# Patient Record
Sex: MALE | Race: WHITE | HISPANIC OR LATINO | Employment: UNEMPLOYED | URBAN - METROPOLITAN AREA
[De-identification: names, ages, dates, MRNs, and addresses within clinical notes are randomized per-mention and may not be internally consistent; named-entity substitution may affect disease eponyms.]

---

## 2024-02-16 ENCOUNTER — HOSPITAL ENCOUNTER (EMERGENCY)
Facility: HOSPITAL | Age: 19
Discharge: PSYCHIATRIC OR SUBSTANCE USE FACILITY | End: 2024-02-17
Attending: INTERNAL MEDICINE

## 2024-02-16 ENCOUNTER — HOSPITAL ENCOUNTER (EMERGENCY)
Facility: HOSPITAL | Age: 19
Discharge: HOME | End: 2024-02-16
Attending: EMERGENCY MEDICINE

## 2024-02-16 VITALS
DIASTOLIC BLOOD PRESSURE: 74 MMHG | SYSTOLIC BLOOD PRESSURE: 132 MMHG | TEMPERATURE: 98 F | HEIGHT: 66 IN | BODY MASS INDEX: 26.84 KG/M2 | HEART RATE: 76 BPM | OXYGEN SATURATION: 99 % | RESPIRATION RATE: 18 BRPM | WEIGHT: 167 LBS

## 2024-02-16 VITALS
HEART RATE: 90 BPM | RESPIRATION RATE: 16 BRPM | TEMPERATURE: 98 F | SYSTOLIC BLOOD PRESSURE: 123 MMHG | OXYGEN SATURATION: 100 % | DIASTOLIC BLOOD PRESSURE: 83 MMHG | WEIGHT: 149.94 LBS

## 2024-02-16 DIAGNOSIS — Z00.8 MEDICAL CLEARANCE FOR PSYCHIATRIC ADMISSION: Primary | ICD-10-CM

## 2024-02-16 DIAGNOSIS — F34.1 DYSTHYMIA: Primary | ICD-10-CM

## 2024-02-16 DIAGNOSIS — R44.0 AUDITORY HALLUCINATIONS: ICD-10-CM

## 2024-02-16 DIAGNOSIS — F41.9 ANXIETY: ICD-10-CM

## 2024-02-16 DIAGNOSIS — R45.851 SUICIDAL IDEATIONS: ICD-10-CM

## 2024-02-16 LAB
ALBUMIN SERPL-MCNC: 4.7 G/DL (ref 3.5–5)
ALBUMIN/GLOB SERPL: 1.8 RATIO (ref 1.1–2)
ALP SERPL-CCNC: 77 UNIT/L (ref 40–150)
ALT SERPL-CCNC: 66 UNIT/L (ref 0–55)
AMPHET UR QL SCN: NEGATIVE
APAP SERPL-MCNC: <17.4 UG/ML (ref 17.4–30)
APPEARANCE UR: CLEAR
AST SERPL-CCNC: 40 UNIT/L (ref 5–34)
BARBITURATE SCN PRESENT UR: NEGATIVE
BASOPHILS # BLD AUTO: 0.01 X10(3)/MCL
BASOPHILS NFR BLD AUTO: 0.1 %
BENZODIAZ UR QL SCN: NEGATIVE
BILIRUB SERPL-MCNC: 0.8 MG/DL
BILIRUB UR QL STRIP.AUTO: NEGATIVE
BUN SERPL-MCNC: 9 MG/DL (ref 8.9–20.6)
CALCIUM SERPL-MCNC: 9.4 MG/DL (ref 8.4–10.2)
CANNABINOIDS UR QL SCN: NEGATIVE
CHLORIDE SERPL-SCNC: 106 MMOL/L (ref 98–107)
CO2 SERPL-SCNC: 23 MMOL/L (ref 22–29)
COCAINE UR QL SCN: NEGATIVE
COLOR UR AUTO: YELLOW
CREAT SERPL-MCNC: 0.82 MG/DL (ref 0.73–1.18)
EOSINOPHIL # BLD AUTO: 0.03 X10(3)/MCL (ref 0–0.9)
EOSINOPHIL NFR BLD AUTO: 0.3 %
ERYTHROCYTE [DISTWIDTH] IN BLOOD BY AUTOMATED COUNT: 13.3 % (ref 11.5–17)
ETHANOL SERPL-MCNC: <10 MG/DL
FENTANYL UR QL SCN: NEGATIVE
GFR SERPLBLD CREATININE-BSD FMLA CKD-EPI: >60 MLS/MIN/1.73/M2
GLOBULIN SER-MCNC: 2.6 GM/DL (ref 2.4–3.5)
GLUCOSE SERPL-MCNC: 102 MG/DL (ref 74–100)
GLUCOSE UR QL STRIP.AUTO: NEGATIVE
HCT VFR BLD AUTO: 48.1 % (ref 42–52)
HGB BLD-MCNC: 16 G/DL (ref 14–18)
IMM GRANULOCYTES # BLD AUTO: 0.01 X10(3)/MCL (ref 0–0.04)
IMM GRANULOCYTES NFR BLD AUTO: 0.1 %
KETONES UR QL STRIP.AUTO: NEGATIVE
LEUKOCYTE ESTERASE UR QL STRIP.AUTO: NEGATIVE
LYMPHOCYTES # BLD AUTO: 1.64 X10(3)/MCL (ref 0.6–4.6)
LYMPHOCYTES NFR BLD AUTO: 17.5 %
MCH RBC QN AUTO: 27.7 PG (ref 27–31)
MCHC RBC AUTO-ENTMCNC: 33.3 G/DL (ref 33–36)
MCV RBC AUTO: 83.4 FL (ref 80–94)
MDMA UR QL SCN: NEGATIVE
MONOCYTES # BLD AUTO: 0.56 X10(3)/MCL (ref 0.1–1.3)
MONOCYTES NFR BLD AUTO: 6 %
NEUTROPHILS # BLD AUTO: 7.12 X10(3)/MCL (ref 2.1–9.2)
NEUTROPHILS NFR BLD AUTO: 76 %
NITRITE UR QL STRIP.AUTO: NEGATIVE
OPIATES UR QL SCN: NEGATIVE
PCP UR QL: NEGATIVE
PH UR STRIP.AUTO: 7 [PH]
PH UR: 7 [PH] (ref 3–11)
PLATELET # BLD AUTO: 235 X10(3)/MCL (ref 130–400)
PMV BLD AUTO: 10.4 FL (ref 7.4–10.4)
POTASSIUM SERPL-SCNC: 3.3 MMOL/L (ref 3.5–5.1)
PROT SERPL-MCNC: 7.3 GM/DL (ref 6.4–8.3)
PROT UR QL STRIP.AUTO: NEGATIVE
RBC # BLD AUTO: 5.77 X10(6)/MCL (ref 4.7–6.1)
RBC UR QL AUTO: NEGATIVE
SALICYLATES SERPL-MCNC: <5 MG/DL (ref 15–30)
SARS-COV-2 RDRP RESP QL NAA+PROBE: NEGATIVE
SODIUM SERPL-SCNC: 139 MMOL/L (ref 136–145)
SP GR UR STRIP.AUTO: 1.01 (ref 1–1.03)
SPECIFIC GRAVITY, URINE AUTO (.000) (OHS): 1.01 (ref 1–1.03)
TSH SERPL-ACNC: 1.25 UIU/ML (ref 0.35–4.94)
UROBILINOGEN UR STRIP-ACNC: 0.2
WBC # SPEC AUTO: 9.37 X10(3)/MCL (ref 4.5–11.5)

## 2024-02-16 PROCEDURE — 87635 SARS-COV-2 COVID-19 AMP PRB: CPT | Performed by: INTERNAL MEDICINE

## 2024-02-16 PROCEDURE — 80143 DRUG ASSAY ACETAMINOPHEN: CPT | Performed by: INTERNAL MEDICINE

## 2024-02-16 PROCEDURE — 82077 ASSAY SPEC XCP UR&BREATH IA: CPT | Performed by: INTERNAL MEDICINE

## 2024-02-16 PROCEDURE — 85025 COMPLETE CBC W/AUTO DIFF WBC: CPT | Performed by: INTERNAL MEDICINE

## 2024-02-16 PROCEDURE — 80307 DRUG TEST PRSMV CHEM ANLYZR: CPT | Performed by: INTERNAL MEDICINE

## 2024-02-16 PROCEDURE — 81003 URINALYSIS AUTO W/O SCOPE: CPT | Mod: 59 | Performed by: INTERNAL MEDICINE

## 2024-02-16 PROCEDURE — 84443 ASSAY THYROID STIM HORMONE: CPT | Performed by: INTERNAL MEDICINE

## 2024-02-16 PROCEDURE — 99281 EMR DPT VST MAYX REQ PHY/QHP: CPT

## 2024-02-16 PROCEDURE — 80179 DRUG ASSAY SALICYLATE: CPT | Performed by: INTERNAL MEDICINE

## 2024-02-16 PROCEDURE — 80053 COMPREHEN METABOLIC PANEL: CPT | Performed by: INTERNAL MEDICINE

## 2024-02-16 PROCEDURE — 99285 EMERGENCY DEPT VISIT HI MDM: CPT

## 2024-02-16 NOTE — ED PROVIDER NOTES
Encounter Date: 2/16/2024       History     Chief Complaint   Patient presents with    Psychiatric Evaluation     REPORTS DEPRESSED, SI W PLAN TO USE KNIFE.  -HI.  +AH+VH REPORTED.  PT WANDED.        Mental Health Problem  The primary symptoms include depressed mood, dysphoric mood and somatic symptoms. The primary symptoms do not include aggression, agitation, bizarre behavior, delusions, disorganized speech, disorganized thinking, hallucinations, homicidal ideas, negative symptoms, paranoia, self-injury, suicidal ideas, suicidal threats or suicide attempt. This is a chronic problem.   Somatic symptoms do not include fatigue, headaches or abdominal pain.   The degree of incapacity that he is experiencing as a consequence of his illness is mild. Sequelae of the illness include harmed interpersonal relations. Additional symptoms of the illness include anhedonia. Additional symptoms of the illness do not include insomnia, hypersomnia, appetite change, unexpected weight change, fatigue, agitation, psychomotor retardation, feelings of worthlessness, attention impairment, euphoric mood, increased goal-directed activity, flight of ideas, inflated self-esteem, decreased need for sleep, distractible, poor judgment, visual change, headaches, abdominal pain or seizures. He does not admit to suicidal ideas. He does not contemplate harming himself. He does not contemplate injuring another person. Risk factors that are present for mental illness include a history of mental illness.     Review of patient's allergies indicates:  No Known Allergies  History reviewed. No pertinent past medical history.  History reviewed. No pertinent surgical history.  History reviewed. No pertinent family history.  Social History     Tobacco Use    Smoking status: Never    Smokeless tobacco: Never   Substance Use Topics    Drug use: Yes     Types: Marijuana     Review of Systems   Constitutional:  Negative for appetite change, fatigue and  unexpected weight change.   Gastrointestinal:  Negative for abdominal pain.   Neurological:  Negative for seizures and headaches.   Psychiatric/Behavioral:  Positive for dysphoric mood. Negative for agitation, hallucinations, homicidal ideas, paranoia, self-injury and suicidal ideas. The patient does not have insomnia.    All other systems reviewed and are negative.      Physical Exam     Initial Vitals [02/16/24 1407]   BP Pulse Resp Temp SpO2   123/83 90 16 97.9 °F (36.6 °C) 100 %      MAP       --         Physical Exam    Nursing note and vitals reviewed.  Constitutional: He appears well-developed and well-nourished. He is not diaphoretic. No distress.   HENT:   Head: Normocephalic and atraumatic.   Eyes: EOM are normal. Pupils are equal, round, and reactive to light. Right eye exhibits no discharge. Left eye exhibits no discharge.   Neck: Neck supple. No thyromegaly present. No tracheal deviation present. No JVD present.   Normal range of motion.  Cardiovascular:  Normal rate, regular rhythm, normal heart sounds and intact distal pulses.           No murmur heard.  Pulmonary/Chest: Breath sounds normal. No stridor. No respiratory distress. He has no wheezes. He has no rhonchi. He has no rales.   Abdominal: Abdomen is soft. He exhibits no distension. There is no abdominal tenderness. There is no rebound and no guarding.   Musculoskeletal:         General: No tenderness or edema. Normal range of motion.      Cervical back: Normal range of motion and neck supple.     Neurological: He is alert and oriented to person, place, and time. He has normal strength. No cranial nerve deficit. GCS score is 15. GCS eye subscore is 4. GCS verbal subscore is 5. GCS motor subscore is 6.   Skin: Skin is warm and dry. Capillary refill takes less than 2 seconds. No rash and no abscess noted. No erythema. No pallor.   Psychiatric: He has a normal mood and affect. His behavior is normal. Judgment and thought content normal.         ED  Course   Procedures  Labs Reviewed - No data to display       Imaging Results    None          Medications - No data to display  Medical Decision Making  18-year-old male presents to the emergency department today with a number of strange ideas, dysthymic, a number of apparently somatic ideas seemingly most compatible with anxiety.  Patient reports a history of bipolar and noncompliance with medications.  There are no suicidal ideas.  There are no evident overt delusional structures nor hallucinations.    Amount and/or Complexity of Data Reviewed  External Data Reviewed: notes.    Risk  Prescription drug management.  Risk Details: 18-year-old male presents to the emergency department with several vague symptoms all of which seemingly most compatible with somatic manifestations of anxiety.  There are no features concerning for overt psychosis.  There is simply is no suggestion of any imminent risk of life or health to patient or any identified others.  He is discharged with links to follow up mental health resources.                                      Clinical Impression:  Final diagnoses:  [F34.1] Dysthymia (Primary)  [F41.9] Anxiety          ED Disposition Condition    Discharge Stable          ED Prescriptions    None       Follow-up Information       Follow up With Specialties Details Why Contact Info    [facility] - Emergency Dept Emergency Medicine  As needed, If symptoms worsen [address] [facility] [id number] [phone]    [facility]  Call   [address] [place] [id number] [phone]             Chaim Melton MD  02/16/24 5615

## 2024-02-17 ENCOUNTER — HOSPITAL ENCOUNTER (INPATIENT)
Facility: HOSPITAL | Age: 19
LOS: 6 days | Discharge: HOME | DRG: 885 | End: 2024-02-23
Attending: PSYCHIATRY & NEUROLOGY | Admitting: PSYCHIATRY & NEUROLOGY

## 2024-02-17 DIAGNOSIS — F32.A DEPRESSION: ICD-10-CM

## 2024-02-17 PROCEDURE — 11400000 HC PSYCH PRIVATE ROOM

## 2024-02-17 PROCEDURE — 25000003 PHARM REV CODE 250: Performed by: PSYCHIATRY & NEUROLOGY

## 2024-02-17 PROCEDURE — 25000003 PHARM REV CODE 250: Performed by: NURSE PRACTITIONER

## 2024-02-17 RX ORDER — ESCITALOPRAM OXALATE 5 MG/1
5 TABLET ORAL DAILY
Status: DISCONTINUED | OUTPATIENT
Start: 2024-02-17 | End: 2024-02-19

## 2024-02-17 RX ORDER — IBUPROFEN 200 MG
1 TABLET ORAL DAILY
Status: DISCONTINUED | OUTPATIENT
Start: 2024-02-17 | End: 2024-02-18

## 2024-02-17 RX ORDER — BUSPIRONE HYDROCHLORIDE 5 MG/1
5 TABLET ORAL 2 TIMES DAILY
Status: DISCONTINUED | OUTPATIENT
Start: 2024-02-17 | End: 2024-02-19

## 2024-02-17 RX ORDER — QUETIAPINE FUMARATE 25 MG/1
25 TABLET, FILM COATED ORAL ONCE
Status: COMPLETED | OUTPATIENT
Start: 2024-02-17 | End: 2024-02-17

## 2024-02-17 RX ORDER — DIPHENHYDRAMINE HCL 50 MG
50 CAPSULE ORAL EVERY 4 HOURS PRN
Status: DISCONTINUED | OUTPATIENT
Start: 2024-02-17 | End: 2024-02-23 | Stop reason: HOSPADM

## 2024-02-17 RX ORDER — QUETIAPINE FUMARATE 50 MG/1
50 TABLET, EXTENDED RELEASE ORAL NIGHTLY
Status: DISCONTINUED | OUTPATIENT
Start: 2024-02-18 | End: 2024-02-19

## 2024-02-17 RX ORDER — QUETIAPINE FUMARATE 50 MG/1
50 TABLET, EXTENDED RELEASE ORAL DAILY
Status: DISCONTINUED | OUTPATIENT
Start: 2024-02-17 | End: 2024-02-17

## 2024-02-17 RX ORDER — HALOPERIDOL 5 MG/1
10 TABLET ORAL EVERY 4 HOURS PRN
Status: DISCONTINUED | OUTPATIENT
Start: 2024-02-17 | End: 2024-02-23 | Stop reason: HOSPADM

## 2024-02-17 RX ORDER — TRAZODONE HYDROCHLORIDE 100 MG/1
100 TABLET ORAL NIGHTLY PRN
Status: DISCONTINUED | OUTPATIENT
Start: 2024-02-17 | End: 2024-02-21

## 2024-02-17 RX ORDER — LORAZEPAM 1 MG/1
2 TABLET ORAL EVERY 4 HOURS PRN
Status: DISCONTINUED | OUTPATIENT
Start: 2024-02-17 | End: 2024-02-23 | Stop reason: HOSPADM

## 2024-02-17 RX ORDER — HYDROXYZINE HYDROCHLORIDE 50 MG/1
50 TABLET, FILM COATED ORAL EVERY 4 HOURS PRN
Status: DISCONTINUED | OUTPATIENT
Start: 2024-02-17 | End: 2024-02-23 | Stop reason: HOSPADM

## 2024-02-17 RX ORDER — ACETAMINOPHEN 325 MG/1
650 TABLET ORAL EVERY 6 HOURS PRN
Status: DISCONTINUED | OUTPATIENT
Start: 2024-02-17 | End: 2024-02-23 | Stop reason: HOSPADM

## 2024-02-17 RX ORDER — DIPHENHYDRAMINE HYDROCHLORIDE 50 MG/ML
50 INJECTION INTRAMUSCULAR; INTRAVENOUS EVERY 4 HOURS PRN
Status: DISCONTINUED | OUTPATIENT
Start: 2024-02-17 | End: 2024-02-23 | Stop reason: HOSPADM

## 2024-02-17 RX ORDER — HALOPERIDOL 5 MG/ML
10 INJECTION INTRAMUSCULAR EVERY 4 HOURS PRN
Status: DISCONTINUED | OUTPATIENT
Start: 2024-02-17 | End: 2024-02-23 | Stop reason: HOSPADM

## 2024-02-17 RX ORDER — ALUMINUM HYDROXIDE, MAGNESIUM HYDROXIDE, AND SIMETHICONE 1200; 120; 1200 MG/30ML; MG/30ML; MG/30ML
30 SUSPENSION ORAL EVERY 6 HOURS PRN
Status: DISCONTINUED | OUTPATIENT
Start: 2024-02-17 | End: 2024-02-23 | Stop reason: HOSPADM

## 2024-02-17 RX ORDER — LORAZEPAM 2 MG/ML
2 INJECTION INTRAMUSCULAR EVERY 4 HOURS PRN
Status: DISCONTINUED | OUTPATIENT
Start: 2024-02-17 | End: 2024-02-23 | Stop reason: HOSPADM

## 2024-02-17 RX ADMIN — QUETIAPINE FUMARATE 25 MG: 25 TABLET ORAL at 01:02

## 2024-02-17 RX ADMIN — HYDROXYZINE HYDROCHLORIDE 50 MG: 50 TABLET, FILM COATED ORAL at 02:02

## 2024-02-17 RX ADMIN — BUSPIRONE HYDROCHLORIDE 5 MG: 5 TABLET ORAL at 08:02

## 2024-02-17 RX ADMIN — ACETAMINOPHEN 650 MG: 325 TABLET, FILM COATED ORAL at 08:02

## 2024-02-17 RX ADMIN — TRAZODONE HYDROCHLORIDE 100 MG: 100 TABLET ORAL at 08:02

## 2024-02-17 RX ADMIN — ESCITALOPRAM 5 MG: 5 TABLET, FILM COATED ORAL at 01:02
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
## 2024-02-17 NOTE — ED PROVIDER NOTES
Encounter Date: 2/16/2024       History     Chief Complaint   Patient presents with    Suicidal     SI, no plan,  depression, feels out of touch with reality.       18-year-old  male presents emergency department stating he has not feeling good and he is hearing voices which makes him have suicidal thoughts.  He has no plan but states he does not trust himself to go home because of the voices in his head that he can not get rid of.  He denies any psychiatric history and refuses to answer any questions about his family      Review of patient's allergies indicates:  No Known Allergies  No past medical history on file.  No past surgical history on file.  No family history on file.  Social History     Tobacco Use    Smoking status: Never    Smokeless tobacco: Never   Substance Use Topics    Alcohol use: Never    Drug use: Yes     Types: Marijuana     Review of Systems   Constitutional: Negative.  Negative for activity change, appetite change, chills, diaphoresis, fatigue, fever and unexpected weight change.   HENT: Negative.  Negative for congestion, dental problem, drooling, ear discharge, ear pain, facial swelling, hearing loss, mouth sores, nosebleeds, postnasal drip, rhinorrhea, sinus pressure, sinus pain, sneezing, sore throat, tinnitus, trouble swallowing and voice change.    Eyes: Negative.  Negative for photophobia, pain, discharge, redness, itching and visual disturbance.   Respiratory: Negative.  Negative for apnea, cough, choking, chest tightness, shortness of breath, wheezing and stridor.    Cardiovascular: Negative.  Negative for chest pain, palpitations and leg swelling.   Gastrointestinal: Negative.  Negative for abdominal distention, abdominal pain, anal bleeding, blood in stool, constipation, diarrhea, nausea, rectal pain and vomiting.   Endocrine: Negative.  Negative for cold intolerance, heat intolerance, polydipsia, polyphagia and polyuria.   Genitourinary: Negative.  Negative for decreased  urine volume, difficulty urinating, dysuria, enuresis, flank pain, frequency, genital sores, hematuria, penile discharge, penile pain, penile swelling, scrotal swelling, testicular pain and urgency.   Musculoskeletal: Negative.  Negative for arthralgias, back pain, gait problem, joint swelling, myalgias, neck pain and neck stiffness.   Skin: Negative.  Negative for color change, pallor, rash and wound.   Allergic/Immunologic: Negative.  Negative for environmental allergies, food allergies and immunocompromised state.   Neurological: Negative.  Negative for dizziness, tremors, seizures, syncope, facial asymmetry, speech difficulty, weakness, light-headedness, numbness and headaches.   Hematological: Negative.  Negative for adenopathy. Does not bruise/bleed easily.   Psychiatric/Behavioral:  Positive for hallucinations and suicidal ideas. Negative for agitation, behavioral problems, confusion, decreased concentration, dysphoric mood, self-injury and sleep disturbance. The patient is nervous/anxious. The patient is not hyperactive.    All other systems reviewed and are negative.      Physical Exam     Initial Vitals [02/16/24 2125]   BP Pulse Resp Temp SpO2   134/79 80 18 98.4 °F (36.9 °C) 98 %      MAP       --         Physical Exam    Nursing note and vitals reviewed.  Constitutional: He appears well-developed and well-nourished.   HENT:   Head: Normocephalic and atraumatic.   Eyes: Conjunctivae and EOM are normal. Pupils are equal, round, and reactive to light.   Neck: Neck supple.   Normal range of motion.  Cardiovascular:  Normal rate and regular rhythm.           Pulmonary/Chest: Breath sounds normal.   Abdominal: Abdomen is soft. Bowel sounds are normal.   Musculoskeletal:         General: Normal range of motion.      Cervical back: Normal range of motion and neck supple.     Neurological: He is alert and oriented to person, place, and time.   Skin: Skin is warm and dry. Capillary refill takes less than 2  seconds.   Psychiatric: His speech is delayed. He is slowed. Thought content is paranoid. Cognition and memory are normal. He exhibits a depressed mood. He expresses suicidal ideation.         ED Course   Procedures  Labs Reviewed   COMPREHENSIVE METABOLIC PANEL - Abnormal; Notable for the following components:       Result Value    Potassium Level 3.3 (*)     Glucose Level 102 (*)     Alanine Aminotransferase 66 (*)     Aspartate Aminotransferase 40 (*)     All other components within normal limits   ACETAMINOPHEN LEVEL - Abnormal; Notable for the following components:    Acetaminophen Level <17.4 (*)     All other components within normal limits   SALICYLATE LEVEL - Abnormal; Notable for the following components:    Salicylate Level <5.0 (*)     All other components within normal limits   TSH - Normal   URINALYSIS, REFLEX TO URINE CULTURE - Normal   DRUG SCREEN, URINE (BEAKER) - Normal    Narrative:     Cut off concentrations:    Amphetamines - 1000 ng/ml  Barbiturates - 200 ng/ml  Benzodiazepine - 200 ng/ml  Cannabinoids (THC) - 50 ng/ml  Cocaine - 300 ng/ml  Fentanyl - 1.0 ng/ml  MDMA - 500 ng/ml  Opiates - 300 ng/ml   Phencyclidine (PCP) - 25 ng/ml    Specimen submitted for drug analysis and tested for pH and specific gravity in order to evaluate sample integrity. Suspect tampering if specific gravity is <1.003 and/or pH is not within the range of 4.5 - 8.0  False negatives may result form substances such as bleach added to urine.  False positives may result for the presence of a substance with similar chemical structure to the drug or its metabolite.    This test provides only a PRELIMINARY analytical test result. A more specific alternate chemical method must be used in order to obtain a confirmed analytical result. Gas chromatography/mass spectrometry (GC/MS) is the preferred confirmatory method. Other chemical confirmation methods are available. Clinical consideration and professional judgement should be  applied to any drug of abuse test result, particularly when preliminary positive results are used.    Positive results will be confirmed only at the physicians request. Unconfirmed screening results are to be used only for medical purposes (treatment).        ALCOHOL,MEDICAL (ETHANOL) - Normal   SARS-COV-2 RNA AMPLIFICATION, QUAL - Normal    Narrative:     The IDNOW COVID-19 assay is a rapid molecular in vitro diagnostic test utilizing an isothermal nucleic acid amplification technology intended for the qualitative detection of nucleic acid from the SARS-CoV-2 viral RNA in direct nasal, nasopharyngeal or throat swabs from individuals who are suspected of COVID-19 by their healthcare provider.   CBC W/ AUTO DIFFERENTIAL    Narrative:     The following orders were created for panel order CBC auto differential.  Procedure                               Abnormality         Status                     ---------                               -----------         ------                     CBC with Differential[[id number]]                           Final result                 Please view results for these tests on the individual orders.   CBC WITH DIFFERENTIAL          Imaging Results    None          Medications - No data to display  Medical Decision Making  18-year-old  male presents emergency department with auditory hallucinations and suicidal ideation.  Differential included polysubstance abuse, schizophrenia, bipolar with psychosis.  Workup was done spoke with patient about possible discharge to follow up with psych as an outpatient however patient does not trust himself to be at home alone and states that he fears what happened because he can not turned the voices off in his head.  I feel he will benefit from inpatient psychiatric services so that point I have visited out a physician's Emergency certificate.  We ordered the psychiatric workup and now patient is cleared for psychiatric services    Problems  Addressed:  Auditory hallucinations: acute illness or injury  Medical clearance for psychiatric admission: acute illness or injury  Suicidal ideations: acute illness or injury    Amount and/or Complexity of Data Reviewed  Labs: ordered. Decision-making details documented in ED Course.    Risk  Decision regarding hospitalization.  Diagnosis or treatment significantly limited by social determinants of health.                  Medically cleared for psychiatry placement: 2/16/2024 11:24 PM                   Clinical Impression:  Final diagnoses:  [Z00.8] Medical clearance for psychiatric admission (Primary)  [R45.851] Suicidal ideations  [R44.0] Auditory hallucinations          ED Disposition Condition    Transfer to Psych Facility Stable          ED Prescriptions    None       Follow-up Information    None          Chad Tello MD  02/16/24 7413

## 2024-02-17 NOTE — H&P
[facility]  History & Physical    Subjective:      Chief Complaint/Reason for Admission: auditory hallucinations     Ciro King is a 18 y.o. male. Auditory hallucinations     No past medical history on file.  No past surgical history on file.  No family history on file.  Social History     Tobacco Use    Smoking status: Never    Smokeless tobacco: Never   Substance Use Topics    Alcohol use: Never    Drug use: Yes     Types: Marijuana       No medications prior to admission.     Review of patient's allergies indicates:  No Known Allergies     Review of Systems   Constitutional: Negative.    HENT: Negative.     Eyes: Negative.    Respiratory: Negative.     Cardiovascular: Negative.    Gastrointestinal: Negative.    Genitourinary: Negative.    Musculoskeletal: Negative.    Skin: Negative.    Neurological: Negative.    Endo/Heme/Allergies: Negative.    Psychiatric/Behavioral:  Positive for hallucinations. Negative for depression, substance abuse and suicidal ideas.        Objective:      Vital Signs (Most Recent)  Temp: 99.7 °F (37.6 °C) (02/17/24 0701)  Pulse: (!) 114 (02/17/24 0701)  Resp: 18 (02/17/24 0701)  BP: 117/68 (02/17/24 0701)  SpO2: 100 % (02/17/24 0701)    Vital Signs Range (Last 24H):  Temp:  [98.4 °F (36.9 °C)-99.7 °F (37.6 °C)]   Pulse:  []   Resp:  [18]   BP: (117-134)/(68-80)   SpO2:  [98 %-100 %]     Physical Exam  HENT:      Head: Normocephalic.      Right Ear: Tympanic membrane normal.      Left Ear: Tympanic membrane normal.      Nose: Nose normal.      Mouth/Throat:      Mouth: Mucous membranes are moist.   Eyes:      Extraocular Movements: Extraocular movements intact.      Pupils: Pupils are equal, round, and reactive to light.   Cardiovascular:      Rate and Rhythm: Normal rate and regular rhythm.   Pulmonary:      Effort: Pulmonary effort is normal.   Abdominal:      General: Abdomen is flat.   Musculoskeletal:         General: Normal  range of motion.   Skin:     General: Skin is warm.   Neurological:      General: No focal deficit present.      Mental Status: He is alert and oriented to person, place, and time.      Comments: Vision normal   Hearing normal   EOM intact   Face muscles normal  Facial sensation normal   Shrugs shoulders  Tongue midline            Data Review:    Recent Results (from the past 48 hour(s))   Drug Screen panel, emergency    Collection Time: 02/16/24  9:41 PM   Result Value Ref Range    Amphetamines, Urine Negative Negative    Barbituates, Urine Negative Negative    Benzodiazepine, Urine Negative Negative    Cannabinoids, Urine Negative Negative    Cocaine, Urine Negative Negative    Fentanyl, Urine Negative Negative    MDMA, Urine Negative Negative    Opiates, Urine Negative Negative    Phencyclidine, Urine Negative Negative    pH, Urine 7.0 3.0 - 11.0    Specific Gravity, Urine Auto 1.010 1.001 - 1.035   COVID-19 Rapid Screening    Collection Time: 02/16/24  9:41 PM   Result Value Ref Range    SARS COV-2 MOLECULAR Negative Negative   Urinalysis, Reflex to Urine Culture    Collection Time: 02/16/24  9:42 PM    Specimen: Urine   Result Value Ref Range    Color, UA Yellow Yellow, Light-Yellow, Dark Yellow, Lizette, Straw    Appearance, UA Clear Clear    Specific Gravity, UA 1.010 1.005 - 1.030    pH, UA 7.0 5.0 - 8.5    Protein, UA Negative Negative    Glucose, UA Negative Negative, Normal    Ketones, UA Negative Negative    Blood, UA Negative Negative    Bilirubin, UA Negative Negative    Urobilinogen, UA 0.2 0.2, 1.0, Normal    Nitrites, UA Negative Negative    Leukocyte Esterase, UA Negative Negative   Comprehensive metabolic panel    Collection Time: 02/16/24 10:02 PM   Result Value Ref Range    Sodium Level 139 136 - 145 mmol/L    Potassium Level 3.3 (L) 3.5 - 5.1 mmol/L    Chloride 106 98 - 107 mmol/L    Carbon Dioxide 23 22 - 29 mmol/L    Glucose Level 102 (H) 74 - 100 mg/dL    Blood Urea Nitrogen 9.0 8.9 - 20.6  mg/dL    Creatinine 0.82 0.73 - 1.18 mg/dL    Calcium Level Total 9.4 8.4 - 10.2 mg/dL    Protein Total 7.3 6.4 - 8.3 gm/dL    Albumin Level 4.7 3.5 - 5.0 g/dL    Globulin 2.6 2.4 - 3.5 gm/dL    Albumin/Globulin Ratio 1.8 1.1 - 2.0 ratio    Bilirubin Total 0.8 <=1.5 mg/dL    Alkaline Phosphatase 77 40 - 150 unit/L    Alanine Aminotransferase 66 (H) 0 - 55 unit/L    Aspartate Aminotransferase 40 (H) 5 - 34 unit/L    eGFR >60 mls/min/1.73/m2   TSH    Collection Time: 02/16/24 10:02 PM   Result Value Ref Range    TSH 1.248 0.350 - 4.940 uIU/mL   Ethanol    Collection Time: 02/16/24 10:02 PM   Result Value Ref Range    Ethanol Level <10.0 <=10.0 mg/dL   Acetaminophen level    Collection Time: 02/16/24 10:02 PM   Result Value Ref Range    Acetaminophen Level <17.4 (L) 17.4 - 30.0 ug/ml   Salicylate level    Collection Time: 02/16/24 10:02 PM   Result Value Ref Range    Salicylate Level <5.0 (L) 15.0 - 30.0 mg/dL   CBC with Differential    Collection Time: 02/16/24 10:02 PM   Result Value Ref Range    WBC 9.37 4.50 - 11.50 x10(3)/mcL    RBC 5.77 4.70 - 6.10 x10(6)/mcL    Hgb 16.0 14.0 - 18.0 g/dL    Hct 48.1 42.0 - 52.0 %    MCV 83.4 80.0 - 94.0 fL    MCH 27.7 27.0 - 31.0 pg    MCHC 33.3 33.0 - 36.0 g/dL    RDW 13.3 11.5 - 17.0 %    Platelet 235 130 - 400 x10(3)/mcL    MPV 10.4 7.4 - 10.4 fL    Neut % 76.0 %    Lymph % 17.5 %    Mono % 6.0 %    Eos % 0.3 %    Basophil % 0.1 %    Lymph # 1.64 0.6 - 4.6 x10(3)/mcL    Neut # 7.12 2.1 - 9.2 x10(3)/mcL    Mono # 0.56 0.1 - 1.3 x10(3)/mcL    Eos # 0.03 0 - 0.9 x10(3)/mcL    Baso # 0.01 <=0.2 x10(3)/mcL    IG# 0.01 0 - 0.04 x10(3)/mcL    IG% 0.1 %        No results found.       Assessment and Plan       Auditory hallucinations
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
## 2024-02-17 NOTE — NURSING
"Pt was admitted earlier today on a PEC from [facility] , currently voicing no ADRs or physical complaints at this time, vital signs are stable, pt is not in any physical distress at the current time, per ER notes, pt is "hearing voices",  This makes him have suicidal ideations, currently voicing no homicidal ideations at this time, pt was put on medication in the past but he did not like the way it made him feel so he stopped taking them, pt will be seen by LISANDRO Graham today, also will be seen by a medical doctor for a routine H and P,  Pt rec'd Atarax on admit for anxiety, will monitor with suicide prec., for anger, psychosis and will be  Assisted prn.  "

## 2024-02-17 NOTE — NURSING
"PRN Medication Follow-up Note:    Behavior:    Patient (Ciro King is a 18 y.o. male, : 2005, MRN: [id number])     Allergies: Patient has no known allergies.    Donnas  height is 5' 6" (1.676 m) and weight is 65.8 kg (145 lb). His oral temperature is 98.7 °F (37.1 °C). His blood pressure is 120/80 and his pulse is 97. His respiration is 18 and oxygen saturation is 100%.     Administered Hydroxyzine 50 mg per physician order to Ciro for anxiety.      Intervention:    Intervention to Ciro's response: None required.       Response:    Ciro's response: No further complaints of anxiety.      Plan:     Continue to monitor per MD/PA/APRN orders; and reevaluate medication effectiveness within 30 minutes.    "

## 2024-02-17 NOTE — PLAN OF CARE
Problem: Adult Inpatient Plan of Care  Goal: Plan of Care Review  Outcome: Ongoing, Not Progressing  Goal: Patient-Specific Goal (Individualized)  Outcome: Ongoing, Not Progressing  Goal: Absence of Hospital-Acquired Illness or Injury  Outcome: Ongoing, Not Progressing  Goal: Optimal Comfort and Wellbeing  Outcome: Ongoing, Not Progressing  Goal: Readiness for Transition of Care  Outcome: Ongoing, Not Progressing     Problem: Behavior Regulation Impairment (Psychotic Signs/Symptoms)  Goal: Improved Behavioral Control (Psychotic Signs/Symptoms)  Outcome: Ongoing, Not Progressing     Problem: Cognitive Impairment (Psychotic Signs/Symptoms)  Goal: Optimal Cognitive Function (Psychotic Signs/Symptoms)  Outcome: Ongoing, Not Progressing     Problem: Decreased Participation and Engagement (Psychotic Signs/Symptoms)  Goal: Increased Participation and Engagement (Psychotic Signs/Symptoms)  Outcome: Ongoing, Not Progressing     Problem: Mood Impairment (Psychotic Signs/Symptoms)  Goal: Improved Mood Symptoms (Psychotic Signs/Symptoms)  Outcome: Ongoing, Not Progressing     Problem: Psychomotor Impairment (Psychotic Signs/Symptoms)  Goal: Improved Psychomotor Symptoms (Psychotic Signs/Symptoms)  Outcome: Ongoing, Not Progressing     Problem: Sensory Perception Impairment (Psychotic Signs/Symptoms)  Goal: Decreased Sensory Symptoms (Psychotic Signs/Symptoms)  Outcome: Ongoing, Not Progressing     Problem: Sleep Disturbance (Psychotic Signs/Symptoms)  Goal: Improved Sleep (Psychotic Signs/Symptoms)  Outcome: Ongoing, Not Progressing     Problem: Social, Occupational or Functional Impairment (Psychotic Signs/Symptoms)  Goal: Enhanced Social, Occupational or Functional Skills (Psychotic Signs/Symptoms)  Outcome: Ongoing, Not Progressing     Problem: Activity and Energy Impairment (Depressive Signs/Symptoms)  Goal: Optimized Energy Level (Depressive Signs/Symptoms)  Outcome: Ongoing, Not Progressing     Problem: Cognitive  Impairment (Depressive Signs/Symptoms)  Goal: Optimized Cognitive Function  Outcome: Ongoing, Not Progressing     Problem: Decreased Participation/Engagement (Depressive Signs/Symptoms)  Goal: Increased Participation and Engagement (Depressive Signs/Symptoms)  Outcome: Ongoing, Not Progressing     Problem: Cognitive Impairment (Depressive Signs/Symptoms)  Goal: Optimized Cognitive Function  Outcome: Ongoing, Not Progressing     Problem: Decreased Participation/Engagement (Depressive Signs/Symptoms)  Goal: Increased Participation and Engagement (Depressive Signs/Symptoms)  Outcome: Ongoing, Not Progressing     Problem: Feelings of Worthlessness, Hopelessness or Excessive Guilt (Depressive Signs/Symptoms)  Goal: Enhanced Self-Esteem and Confidence (Depressive Signs/Symptoms)  Outcome: Ongoing, Not Progressing     Problem: Mood Impairment (Depressive Signs/Symptoms)  Goal: Improved Mood Symptoms (Depressive Signs/Symptoms)  Outcome: Ongoing, Not Progressing     Problem: Nutrition Imbalance (Depressive Signs/Symptoms)  Goal: Optimized Nutrition Intake  Outcome: Ongoing, Not Progressing     Problem: Psychomotor Impairment (Depressive Signs/Symptoms)  Goal: Improved Psychomotor Symptoms (Depressive Signs/Symptoms)  Outcome: Ongoing, Not Progressing     Problem: Sleep Disturbance (Depressive Signs/Symptoms)  Goal: Improved Sleep (Depressive Signs/Symptoms)  Outcome: Ongoing, Not Progressing     Problem: Social, Occupational or Functional Impairment (Depressive Signs/Symptoms)  Goal: Enhanced Social, Occupational or Functional Skills (Depressive Signs/Symptoms)  Outcome: Ongoing, Not Progressing

## 2024-02-18 PROCEDURE — 11400000 HC PSYCH PRIVATE ROOM

## 2024-02-18 PROCEDURE — 25000003 PHARM REV CODE 250: Performed by: PSYCHIATRY & NEUROLOGY

## 2024-02-18 PROCEDURE — 25000003 PHARM REV CODE 250: Performed by: NURSE PRACTITIONER

## 2024-02-18 RX ADMIN — HYDROXYZINE HYDROCHLORIDE 50 MG: 50 TABLET, FILM COATED ORAL at 04:02

## 2024-02-18 RX ADMIN — QUETIAPINE FUMARATE 50 MG: 50 TABLET, EXTENDED RELEASE ORAL at 08:02

## 2024-02-18 RX ADMIN — TRAZODONE HYDROCHLORIDE 100 MG: 100 TABLET ORAL at 08:02

## 2024-02-18 RX ADMIN — BUSPIRONE HYDROCHLORIDE 5 MG: 5 TABLET ORAL at 08:02

## 2024-02-18 RX ADMIN — HYDROXYZINE HYDROCHLORIDE 50 MG: 50 TABLET, FILM COATED ORAL at 05:02

## 2024-02-18 RX ADMIN — ACETAMINOPHEN 650 MG: 325 TABLET, FILM COATED ORAL at 08:02

## 2024-02-18 RX ADMIN — ESCITALOPRAM 5 MG: 5 TABLET, FILM COATED ORAL at 08:02

## 2024-02-18 NOTE — NURSING
"Pt is currently voicing no ADRs or physical complaints at this time, vital signs are stable,   Pt is not in any physical distress at the current time, currently voicing no suicidal or homicidal ideations at this time, pt is very anxious, nervous, isolates, pt admits to paranoid delusions, states,  "I feel like my head is swelling.""Feel like my brain is melting.", appears to be RIS,  compliant with medication ordered, pt was seen yest. By LISANDRO Graham,  Seroquel XR ordered along with Lexapro and Buspar, pt did receive Atarax and Trazodone last night, reports trouble sleeping last night with racing thoughts and worrying, will monitor with suicide prec. For anger, and psychosis and will assist prn.  "

## 2024-02-18 NOTE — NURSING
"PRN Medication Follow-up Note:    Behavior:    Patient (Ciro King is a 18 y.o. male, : 2005, MRN: [id number])     Allergies: Patient has no known allergies.    Donnas  height is 5' 6" (1.676 m) and weight is 65.8 kg (145 lb). His oral temperature is 99 °F (37.2 °C). His blood pressure is 116/71 and his pulse is 95. His respiration is 18 and oxygen saturation is 96%.     Administered hydroxyzine 50 mg po per physician order to Ciro       Intervention:    Intervention to Ciro's response: to relieve anxiety.       Response:    Ciro's response: effective      Plan:     Continue to monitor per MD/PA/APRN orders; and reevaluate medication effectiveness within 30 minutes.   "

## 2024-02-18 NOTE — NURSING
"PRN Administration Note:    Behavior:    Patient (Ciro King is a 18 y.o. male, : 2005, MRN: [id number])     Allergies: Patient has no known allergies.    Ciro's  height is 5' 6" (1.676 m) and weight is 65.8 kg (145 lb). His oral temperature is 99 °F (37.2 °C). His blood pressure is 116/71 and his pulse is 95. His respiration is 18 and oxygen saturation is 96%.     Reason for PRN Administration: anxiety.    Intervention:    Administered hydroxyzine 50 mg po per physician order to Ciro       Response:    Ciro tolerated administration well.      Plan:     Continue to monitor per MD/PA/APRN orders; and reevaluate medication effectiveness within 30 minutes.   "

## 2024-02-18 NOTE — NURSING
"PRN Medication Follow-up Note:    Behavior:    Patient (Ciro King is a 18 y.o. male, : 2005, MRN: [id number])     Allergies: Patient has no known allergies.    Ciro's  height is 5' 6" (1.676 m) and weight is 65.8 kg (145 lb). His temperature is 98.4 °F (36.9 °C). His blood pressure is 127/87 and his pulse is 105. His respiration is 16 and oxygen saturation is 96%.     Administered tylenol 650 mg po and trazodone 100 mg po per physician order to Ciro       Intervention:    Intervention to Ciro's response: to relieve headache pain and promote sleep.       Response:    Ciro's response: effective      Plan:     Continue to monitor per MD/PA/APRN orders; and reevaluate medication effectiveness within 30 minutes.   "

## 2024-02-18 NOTE — PLAN OF CARE
Problem: Adult Inpatient Plan of Care  Goal: Plan of Care Review  Outcome: Ongoing, Progressing  Goal: Patient-Specific Goal (Individualized)  Outcome: Ongoing, Progressing  Goal: Absence of Hospital-Acquired Illness or Injury  Outcome: Ongoing, Progressing  Goal: Optimal Comfort and Wellbeing  Outcome: Ongoing, Progressing  Goal: Readiness for Transition of Care  Outcome: Ongoing, Progressing     Problem: Behavior Regulation Impairment (Psychotic Signs/Symptoms)  Goal: Improved Behavioral Control (Psychotic Signs/Symptoms)  Outcome: Ongoing, Progressing     Problem: Cognitive Impairment (Psychotic Signs/Symptoms)  Goal: Optimal Cognitive Function (Psychotic Signs/Symptoms)  Outcome: Ongoing, Progressing     Problem: Decreased Participation and Engagement (Psychotic Signs/Symptoms)  Goal: Increased Participation and Engagement (Psychotic Signs/Symptoms)  Outcome: Ongoing, Progressing     Problem: Mood Impairment (Psychotic Signs/Symptoms)  Goal: Improved Mood Symptoms (Psychotic Signs/Symptoms)  Outcome: Ongoing, Progressing     Problem: Psychomotor Impairment (Psychotic Signs/Symptoms)  Goal: Improved Psychomotor Symptoms (Psychotic Signs/Symptoms)  Outcome: Ongoing, Progressing     Problem: Sensory Perception Impairment (Psychotic Signs/Symptoms)  Goal: Decreased Sensory Symptoms (Psychotic Signs/Symptoms)  Outcome: Ongoing, Progressing     Problem: Sleep Disturbance (Psychotic Signs/Symptoms)  Goal: Improved Sleep (Psychotic Signs/Symptoms)  Outcome: Ongoing, Progressing     Problem: Social, Occupational or Functional Impairment (Psychotic Signs/Symptoms)  Goal: Enhanced Social, Occupational or Functional Skills (Psychotic Signs/Symptoms)  Outcome: Ongoing, Progressing     Problem: Activity and Energy Impairment (Depressive Signs/Symptoms)  Goal: Optimized Energy Level (Depressive Signs/Symptoms)  Outcome: Ongoing, Progressing     Problem: Decreased Participation/Engagement (Depressive Signs/Symptoms)  Goal:  Increased Participation and Engagement (Depressive Signs/Symptoms)  Outcome: Ongoing, Progressing     Problem: Feelings of Worthlessness, Hopelessness or Excessive Guilt (Depressive Signs/Symptoms)  Goal: Enhanced Self-Esteem and Confidence (Depressive Signs/Symptoms)  Outcome: Ongoing, Progressing     Problem: Mood Impairment (Depressive Signs/Symptoms)  Goal: Improved Mood Symptoms (Depressive Signs/Symptoms)  Outcome: Ongoing, Progressing     Problem: Nutrition Imbalance (Depressive Signs/Symptoms)  Goal: Optimized Nutrition Intake  Outcome: Ongoing, Progressing     Problem: Psychomotor Impairment (Depressive Signs/Symptoms)  Goal: Improved Psychomotor Symptoms (Depressive Signs/Symptoms)  Outcome: Ongoing, Progressing     Problem: Sleep Disturbance (Depressive Signs/Symptoms)  Goal: Improved Sleep (Depressive Signs/Symptoms)  Outcome: Ongoing, Progressing     Problem: Social, Occupational or Functional Impairment (Depressive Signs/Symptoms)  Goal: Enhanced Social, Occupational or Functional Skills (Depressive Signs/Symptoms)  Outcome: Ongoing, Progressing

## 2024-02-18 NOTE — NURSING
"PRN Administration Note:    Behavior:    Patient (Ciro King is a 18 y.o. male, : 2005, MRN: [id number])     Allergies: Patient has no known allergies.    Ciro's  height is 5' 6" (1.676 m) and weight is 65.8 kg (145 lb). His temperature is 98.4 °F (36.9 °C). His blood pressure is 127/87 and his pulse is 105. His respiration is 16 and oxygen saturation is 96%.     Reason for PRN Administration: headache and insomnia.    Intervention:    Administered tylenol 650 mg po and trazodone 100 mg po per physician order to Ciro       Response:    Ciro tolerated administration well.      Plan:     Continue to monitor per MD/PA/APRN orders; and reevaluate medication effectiveness within 30 minutes.   "

## 2024-02-18 NOTE — NURSING
"Daily Nursing Note:      Behavior:    Patient (Ciro King is a 18 y.o. male, : 2005, MRN: [id number]) demonstrating an affect that was flat and anxious. Ciro demonstrating mood that is depressed and anxious. Ciro had an appearance that was clean. Ciro denies suicidal ideation. Ciro denies suicide plan. Ciro denies homicidal ideation. Ciro denies hallucinations.    Ciro's  height is 5' 6" (1.676 m) and weight is 65.8 kg (145 lb). His temperature is 98.4 °F (36.9 °C). His blood pressure is 127/87 and his pulse is 105. His respiration is 16 and oxygen saturation is 96%.         Intervention:    Encourage Ciro to perform self-hygiene, grooming, and changing of clothing. Monitor Ciro's behavior and program compliance. Monitor Ciro for suicidal ideation, homicidal ideation, sleep disturbance, and hallucinations. Encourage Ciro to eat all portions of meals and assess for meal preferences. Monitor Ciro for intake and output to ensure hydration. Notify the Physician/Physician Assistant/Advance Practice Registered Nurse (MD/PA/APRN) for any medication refusal and any change in patient condition.      Response:    Ciro verbalizes understand of unit process and procedures.      Plan:     Continue to monitor per MD/PA/APRN orders; maintain patient safety.   "

## 2024-02-18 NOTE — NURSING
Pt with complaint of anxiety, pt rec'd Atarax 50 mg po prn anxiety, will monitor for effectiveness.

## 2024-02-19 LAB
ALBUMIN SERPL-MCNC: 4.5 G/DL (ref 3.5–5)
ALBUMIN/GLOB SERPL: 1.9 RATIO (ref 1.1–2)
ALP SERPL-CCNC: 80 UNIT/L (ref 40–150)
ALT SERPL-CCNC: 43 UNIT/L (ref 0–55)
AST SERPL-CCNC: 22 UNIT/L (ref 5–34)
BASOPHILS # BLD AUTO: 0.02 X10(3)/MCL
BASOPHILS NFR BLD AUTO: 0.3 %
BILIRUB SERPL-MCNC: 1 MG/DL
BUN SERPL-MCNC: 13.1 MG/DL (ref 8.9–20.6)
CALCIUM SERPL-MCNC: 9.7 MG/DL (ref 8.4–10.2)
CHLORIDE SERPL-SCNC: 108 MMOL/L (ref 98–107)
CHOLEST SERPL-MCNC: 135 MG/DL
CHOLEST/HDLC SERPL: 2 {RATIO} (ref 0–5)
CO2 SERPL-SCNC: 27 MMOL/L (ref 22–29)
CREAT SERPL-MCNC: 0.88 MG/DL (ref 0.73–1.18)
EOSINOPHIL # BLD AUTO: 0.08 X10(3)/MCL (ref 0–0.9)
EOSINOPHIL NFR BLD AUTO: 1.1 %
ERYTHROCYTE [DISTWIDTH] IN BLOOD BY AUTOMATED COUNT: 13.1 % (ref 11.5–17)
EST. AVERAGE GLUCOSE BLD GHB EST-MCNC: 93.9 MG/DL
GFR SERPLBLD CREATININE-BSD FMLA CKD-EPI: >60 MLS/MIN/1.73/M2
GLOBULIN SER-MCNC: 2.4 GM/DL (ref 2.4–3.5)
GLUCOSE SERPL-MCNC: 89 MG/DL (ref 74–100)
HBA1C MFR BLD: 4.9 %
HCT VFR BLD AUTO: 49.1 % (ref 42–52)
HDLC SERPL-MCNC: 56 MG/DL (ref 35–60)
HGB BLD-MCNC: 16.1 G/DL (ref 14–18)
IMM GRANULOCYTES # BLD AUTO: 0.01 X10(3)/MCL (ref 0–0.04)
IMM GRANULOCYTES NFR BLD AUTO: 0.1 %
LDLC SERPL CALC-MCNC: 71 MG/DL (ref 50–140)
LYMPHOCYTES # BLD AUTO: 2.88 X10(3)/MCL (ref 0.6–4.6)
LYMPHOCYTES NFR BLD AUTO: 39.2 %
MCH RBC QN AUTO: 27.5 PG (ref 27–31)
MCHC RBC AUTO-ENTMCNC: 32.8 G/DL (ref 33–36)
MCV RBC AUTO: 83.9 FL (ref 80–94)
MONOCYTES # BLD AUTO: 0.47 X10(3)/MCL (ref 0.1–1.3)
MONOCYTES NFR BLD AUTO: 6.4 %
NEUTROPHILS # BLD AUTO: 3.89 X10(3)/MCL (ref 2.1–9.2)
NEUTROPHILS NFR BLD AUTO: 52.9 %
NRBC BLD AUTO-RTO: 0 %
PLATELET # BLD AUTO: 230 X10(3)/MCL (ref 130–400)
PMV BLD AUTO: 10.9 FL (ref 7.4–10.4)
POTASSIUM SERPL-SCNC: 4.9 MMOL/L (ref 3.5–5.1)
PROT SERPL-MCNC: 6.9 GM/DL (ref 6.4–8.3)
RBC # BLD AUTO: 5.85 X10(6)/MCL (ref 4.7–6.1)
SODIUM SERPL-SCNC: 142 MMOL/L (ref 136–145)
T PALLIDUM AB SER QL: NONREACTIVE
TRIGL SERPL-MCNC: 42 MG/DL (ref 34–140)
TSH SERPL-ACNC: 1.73 UIU/ML (ref 0.35–4.94)
VLDLC SERPL CALC-MCNC: 8 MG/DL
WBC # SPEC AUTO: 7.35 X10(3)/MCL (ref 4.5–11.5)

## 2024-02-19 PROCEDURE — 83036 HEMOGLOBIN GLYCOSYLATED A1C: CPT | Performed by: PSYCHIATRY & NEUROLOGY

## 2024-02-19 PROCEDURE — 25000003 PHARM REV CODE 250: Performed by: NURSE PRACTITIONER

## 2024-02-19 PROCEDURE — 80053 COMPREHEN METABOLIC PANEL: CPT | Performed by: PSYCHIATRY & NEUROLOGY

## 2024-02-19 PROCEDURE — 80061 LIPID PANEL: CPT | Performed by: PSYCHIATRY & NEUROLOGY

## 2024-02-19 PROCEDURE — 86780 TREPONEMA PALLIDUM: CPT | Performed by: PSYCHIATRY & NEUROLOGY

## 2024-02-19 PROCEDURE — 85025 COMPLETE CBC W/AUTO DIFF WBC: CPT | Performed by: PSYCHIATRY & NEUROLOGY

## 2024-02-19 PROCEDURE — 11400000 HC PSYCH PRIVATE ROOM

## 2024-02-19 PROCEDURE — 84443 ASSAY THYROID STIM HORMONE: CPT | Performed by: PSYCHIATRY & NEUROLOGY

## 2024-02-19 PROCEDURE — 25000003 PHARM REV CODE 250: Performed by: PSYCHIATRY & NEUROLOGY

## 2024-02-19 RX ORDER — ESCITALOPRAM OXALATE 10 MG/1
10 TABLET ORAL DAILY
Status: DISCONTINUED | OUTPATIENT
Start: 2024-02-20 | End: 2024-02-21

## 2024-02-19 RX ORDER — ARIPIPRAZOLE 5 MG/1
10 TABLET ORAL DAILY
Status: DISCONTINUED | OUTPATIENT
Start: 2024-02-19 | End: 2024-02-21

## 2024-02-19 RX ADMIN — BUSPIRONE HYDROCHLORIDE 5 MG: 5 TABLET ORAL at 08:02

## 2024-02-19 RX ADMIN — TRAZODONE HYDROCHLORIDE 100 MG: 100 TABLET ORAL at 08:02

## 2024-02-19 RX ADMIN — ACETAMINOPHEN 650 MG: 325 TABLET, FILM COATED ORAL at 08:02

## 2024-02-19 RX ADMIN — HYDROXYZINE HYDROCHLORIDE 50 MG: 50 TABLET, FILM COATED ORAL at 08:02

## 2024-02-19 RX ADMIN — ESCITALOPRAM 5 MG: 5 TABLET, FILM COATED ORAL at 08:02

## 2024-02-19 RX ADMIN — ARIPIPRAZOLE 10 MG: 5 TABLET ORAL at 11:02

## 2024-02-19 RX ADMIN — HYDROXYZINE HYDROCHLORIDE 50 MG: 50 TABLET, FILM COATED ORAL at 04:02

## 2024-02-19 NOTE — NURSING
"Daily Nursing Note:      Behavior:    Patient (Ciro King is a 18 y.o. male, : 2005, MRN: [id number]) demonstrating an affect that was sad and flat. Ciro demonstrating mood that is anxious and depressed. Ciro had an appearance that was clean. Ciro denies suicidal ideation. Ciro denies suicide plan. Ciro denies homicidal ideation. Ciro denies hallucinations.    Ciro's  height is 5' 6" (1.676 m) and weight is 65.8 kg (145 lb). His temperature is 98.1 °F (36.7 °C). His blood pressure is 133/73 and his pulse is 105. His respiration is 18 and oxygen saturation is 98%.       Intervention:    Encourage Ciro to perform self-hygiene, grooming, and changing of clothing. Monitor Ciro's behavior and program compliance. Monitor Ciro for suicidal ideation, homicidal ideation, sleep disturbance, and hallucinations. Encourage Ciro to eat all portions of meals and assess for meal preferences. Monitor iCro for intake and output to ensure hydration. Notify the Physician/Physician Assistant/Advance Practice Registered Nurse (MD/PA/APRN) for any medication refusal and any change in patient condition.      Response:    Ciro verbalizes understand of unit process and procedures.      Plan:     Continue to monitor per MD/PA/APRN orders; maintain patient safety.   "

## 2024-02-19 NOTE — PROGRESS NOTES
Ciro is a 18 yr. old male admitted for Unspecified Psychotic Disorder (F29) and Unspecified Mood Disorder (F39) with a - uds. Ciro reported English as a second language with Tanzanian his Primary language but, did not feel he needed nor request a Tanzanian speaking . STRS/CTRS met with pt 1:1, Ciro was cooperative but appeared anxious with a bright affect and reported ability to perform his ADL's. STRS/CTRS educated pt to TR group times and dates with Ciro agreeing to attend TR groups. Ciro reported his treatment goal as coping skills and to decrease anxiety.     02/19/24 1231   General   Admit Date 02/17/24   Primary Diagnosis Unspecified Psychotic Disorder (F29)  Unspecified Mood Disorder (F39)   Adventist spiritual   Number of Children 0   Children Living? 0   Occupation machinery   Does the patient have dentures? No   If you were to take part in activities, which of the following would you prefer? Both   Do you feel like you have enough to keep you busy now? Yes   Do you believe that you have the opportunity for physical activity? Yes   Activity Capabilities Moderate   Subjective   Patient states I started feeling bad, hallucinating, panic, paranioa, depressed, and anxiety   Assessment   Mobility ambulates independently   Transfers independently   Musculoskeletal   (none)   Visual Acuity normal vision   Visual Perception depth perception;color perception;recognizes letters;recognizes numbers   Hearing normal   Speech/Communication normal   Cognitive Concerns oriented x4   Emotional Concerns appears anxious   Leisure Interest Survey   Leisure Interest Survey Yes   Social/Group Activities   Restaurant Current Interest   Solitary Activities   Music Listening Current Interest   Reading Current Interest   Physical Activities   Walk/Run Current Interest   Creative Activities   Drawing Current Interest   Painting Current Interest   Cooking Current Interest   Outdoor Activities   Camping Current Interest   Other  Outdoor Activity   (du)   Goals   Additional Documentation yes   Goal Formulation With patient   Time For Goal Achievement 7 days   Goal 1 coping skils and to decrease anxiety   Goal 1-Progress on going progressing   Plan   Planned Therapy Intervention Group Recreational Therapy   Expected Length of Stay 5-7 days   PT Frequency Minimum of 3 visits per week
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
## 2024-02-19 NOTE — NURSING
Pt is currently voicing no ADRs or physical complaints at this time, vital signs are stable, pt  Is not in any physical distress at the current time,  Currently voicing no suicidal or homicidal ideations at this time, pt does appear anxious, worried, sad and withdrawn, pt was seen today by  Dr De La Torre, he thinks his brain if on fire and it is moving, Buspar was dc'd, Lexapro was increased, Seroquel was dc'd, started on Abilify.  Voicing no detox symptoms at this time, will monitor with suicide prec., for anger, psychosis and detox symptoms and will be assisted prn.

## 2024-02-19 NOTE — NURSING
Pt with complaint of anxiety, pt received Atarax 50 mg PO prn anxiety, will monitor for effectiveness.

## 2024-02-19 NOTE — NURSING
"PRN Administration Note:    Behavior:    Patient (Ciro King is a 18 y.o. male, : 2005, MRN: [id number])     Allergies: Patient has no known allergies.    Ciro's  height is 5' 6" (1.676 m) and weight is 65.8 kg (145 lb). His oral temperature is 98.1 °F (36.7 °C). His blood pressure is 128/75 and his pulse is 91. His respiration is 18 and oxygen saturation is 97%.     Reason for PRN Administration: Insomnia and headache.    Intervention:    Administered Trazodone 100 mg po and tylenol 650 mg po per physician order to Ciro       Response:    Ciro tolerated administration well.      Plan:     Continue to monitor per MD/PA/APRN orders; and reevaluate medication effectiveness within 30 minutes.   "

## 2024-02-19 NOTE — GROUP NOTE
Group Psychotherapy       Group Focus: Promoting Healthy Lifestyles   Group topic: Treatment Planning. Therapist explored patients need for identifying personal strengths and qualities in working towards mental health goals. Patients were educated on discharge planning and engaged in open discussion about how effective discharge planning can help decrease potential for inpatient treatment.     Number of patients in attendance: 11    Group Start Time: 1045  Group End Time:  1130  Groups Date: 2/19/2024  Group Topic:  Behavioral Health  Group Department: [facility] - Behavioral Health Unit  Group Facilitators:  Kathe Douglass MSW  _____________________________________________________________________    Patient Name: Ciro King  MRN: [id number]  Patient Class: IP- Psych   Admission Date\Time: 2/17/2024  2:04 AM  Hospital Length of Stay: 2  Primary Care Provider: Tanisha, Primary Doctor     Referred by: Acute Psychiatry Unit Treatment Team     Target symptoms: Depression and Psychosis     Patient's response to treatment: Active Listening     Progress toward goals: Not progressing     Interval History:      Diagnosis:      Plan: Continue treatment on [facility]

## 2024-02-19 NOTE — PROGRESS NOTES
"2/19/2024  Ciro King   2005   [id number]        Psychiatry Progress Note     Chief Complaint: "I feel like my brain is hot"    SUBJECTIVE:   Ciro King is a 18 y.o. male placed under a PEC at [facility] after presenting with hallucinations and suicidal thoughts.     This morning, he states that his brain feels "hot" and burning at his forehead.  Also reports dizziness.  "I feel like my brain is moving inside."    He had a history of hallucinations 2 months ago after using mushrooms and 2 weeks ago after using marijuana.  Discussed abstinence from substance use.  Will discontinue Buspar, increase Lexapro dose, and will change Seroquel to Abilify.    UDS: (-)  Blood alcohol: <10    Current Medications:   Scheduled Meds:    busPIRone  5 mg Oral BID    EScitalopram oxalate  5 mg Oral Daily    QUEtiapine  50 mg Oral QHS      PRN Meds: acetaminophen, aluminum-magnesium hydroxide-simethicone, haloperidoL **AND** diphenhydrAMINE **AND** LORazepam **AND** haloperidol lactate **AND** diphenhydrAMINE **AND** lorazepam, hydrOXYzine HCL, traZODone   Psychotherapeutics (From admission, onward)      Start     Stop Route Frequency Ordered    02/18/24 2100  QUEtiapine 24 hr tablet 50 mg         -- Oral Nightly 02/17/24 1454    02/17/24 2100  busPIRone tablet 5 mg         -- Oral 2 times daily 02/17/24 1333    02/17/24 1445  EScitalopram oxalate tablet 5 mg         -- Oral Daily 02/17/24 1333    02/17/24 0221  haloperidoL tablet 10 mg  (Med - Acute  Behavioral Management)        See Hyperspace for full Linked Orders Report.    -- Oral Every 4 hours PRN 02/17/24 0221    02/17/24 0221  LORazepam tablet 2 mg  (Med - Acute  Behavioral Management)        See Hyperspace for full Linked Orders Report.    -- Oral Every 4 hours PRN 02/17/24 0221    02/17/24 0221  haloperidol lactate injection 10 mg  (Med - Acute  Behavioral Management)        See Hyperspace for full Linked Orders Report.    -- IM Every 4 hours " PRN 02/17/24 0221    02/17/24 0221  LORazepam injection 2 mg  (Med - Acute  Behavioral Management)        See Hyperspace for full Linked Orders Report.    -- IM Every 4 hours PRN 02/17/24 0221    02/17/24 0221  traZODone tablet 100 mg         -- Oral Nightly PRN 02/17/24 0221            Allergies:   Review of patient's allergies indicates:  No Known Allergies     OBJECTIVE:   Vitals   Vitals:    02/19/24 0701   BP: 127/83   Pulse: 105   Resp:    Temp: 98.4 °F (36.9 °C)        Labs/Imaging/Studies:   Recent Results (from the past 36 hour(s))   Hemoglobin A1C    Collection Time: 02/19/24  7:24 AM   Result Value Ref Range    Hemoglobin A1c 4.9 <=7.0 %    Estimated Average Glucose 93.9 mg/dL   Comprehensive Metabolic Panel    Collection Time: 02/19/24  7:24 AM   Result Value Ref Range    Sodium Level 142 136 - 145 mmol/L    Potassium Level 4.9 3.5 - 5.1 mmol/L    Chloride 108 (H) 98 - 107 mmol/L    Carbon Dioxide 27 22 - 29 mmol/L    Glucose Level 89 74 - 100 mg/dL    Blood Urea Nitrogen 13.1 8.9 - 20.6 mg/dL    Creatinine 0.88 0.73 - 1.18 mg/dL    Calcium Level Total 9.7 8.4 - 10.2 mg/dL    Protein Total 6.9 6.4 - 8.3 gm/dL    Albumin Level 4.5 3.5 - 5.0 g/dL    Globulin 2.4 2.4 - 3.5 gm/dL    Albumin/Globulin Ratio 1.9 1.1 - 2.0 ratio    Bilirubin Total 1.0 <=1.5 mg/dL    Alkaline Phosphatase 80 40 - 150 unit/L    Alanine Aminotransferase 43 0 - 55 unit/L    Aspartate Aminotransferase 22 5 - 34 unit/L    eGFR >60 mls/min/1.73/m2   TSH    Collection Time: 02/19/24  7:24 AM   Result Value Ref Range    TSH 1.734 0.350 - 4.940 uIU/mL   Lipid Panel    Collection Time: 02/19/24  7:24 AM   Result Value Ref Range    Cholesterol Total 135 <=200 mg/dL    HDL Cholesterol 56 35 - 60 mg/dL    Triglyceride 42 34 - 140 mg/dL    Cholesterol/HDL Ratio 2 0 - 5    Very Low Density Lipoprotein 8     LDL Cholesterol 71.00 50.00 - 140.00 mg/dL   CBC with Differential    Collection Time: 02/19/24  7:24 AM   Result Value Ref Range    WBC  "7.35 4.50 - 11.50 x10(3)/mcL    RBC 5.85 4.70 - 6.10 x10(6)/mcL    Hgb 16.1 14.0 - 18.0 g/dL    Hct 49.1 42.0 - 52.0 %    MCV 83.9 80.0 - 94.0 fL    MCH 27.5 27.0 - 31.0 pg    MCHC 32.8 (L) 33.0 - 36.0 g/dL    RDW 13.1 11.5 - 17.0 %    Platelet 230 130 - 400 x10(3)/mcL    MPV 10.9 (H) 7.4 - 10.4 fL    Neut % 52.9 %    Lymph % 39.2 %    Mono % 6.4 %    Eos % 1.1 %    Basophil % 0.3 %    Lymph # 2.88 0.6 - 4.6 x10(3)/mcL    Neut # 3.89 2.1 - 9.2 x10(3)/mcL    Mono # 0.47 0.1 - 1.3 x10(3)/mcL    Eos # 0.08 0 - 0.9 x10(3)/mcL    Baso # 0.02 <=0.2 x10(3)/mcL    IG# 0.01 0 - 0.04 x10(3)/mcL    IG% 0.1 %    NRBC% 0.0 %          Medical Review Of Systems:  Constitutional: negative  Respiratory: negative  Cardiovascular: negative  Gastrointestinal: negative  Genitourinary:negative  Musculoskeletal:negative  Neurological: negative       Psychiatric Mental Status Exam:  General Appearance: appears stated age, well-developed, well-nourished  Arousal: alert  Behavior: cooperative  Movements and Motor Activity: no abnormal involuntary movements noted  Orientation: oriented to person, place, time, and situation  Speech: normal rate, normal rhythm, normal volume, normal tone  Mood: "I feel like my brain is hot"  Affect: constricted  Thought Process: grossly linear  Associations: intact  Thought Content and Perceptions: (+)hallucinations and delusions, passive suicidal ideation, no homicidal ideation  Recent and Remote Memory: recent memory intact, remote memory intact; per interview/observation with patient  Attention and Concentration: intact, attentive to conversation; per interview/observation with patient  Fund of Knowledge: intact, aware of current events, vocabulary appropriate; based on history, vocabulary, fund of knowledge, syntax, grammar, and content  Insight: questionable; based on understanding of severity of illness and HPI  Judgment: questionable; based on patient's behavior and HPI     ASSESSMENT/PLAN:   Problems " Addressed/Diagnoses:  Unspecified Psychotic Disorder (F29)  Unspecified Mood Disorder (F39)    No past medical history on file.     Plan:  Psychosis, acute  -D/c Seroquel  -Abilify 5mg daily    Mood disorder, acute  -Increase Lexapro to 10mg daily    -D/c Buspar    Expected Disposition Plan: Home        Korey De La Torre M.D.

## 2024-02-19 NOTE — NURSING
"PRN Medication Follow-up Note:    Behavior:    Patient (Ciro King is a 18 y.o. male, : 2005, MRN: [id number])     Allergies: Patient has no known allergies.    Ciro's  height is 5' 6" (1.676 m) and weight is 65.8 kg (145 lb). His oral temperature is 98.1 °F (36.7 °C). His blood pressure is 128/75 and his pulse is 91. His respiration is 18 and oxygen saturation is 97%.     Administered Trazodone 100 mg po and tylenol 650 mg po per physician order to Ciro       Intervention:    Intervention to Ciro's response: to promote sleep and relieve anxiety.       Response:    Ciro's response: effective.      Plan:     Continue to monitor per MD/PA/APRN orders; and reevaluate medication effectiveness within 30 minutes.   "

## 2024-02-19 NOTE — PLAN OF CARE
Problem: Adult Inpatient Plan of Care  Goal: Plan of Care Review  Outcome: Ongoing, Progressing  Goal: Patient-Specific Goal (Individualized)  Outcome: Ongoing, Progressing  Goal: Absence of Hospital-Acquired Illness or Injury  Outcome: Ongoing, Progressing  Goal: Optimal Comfort and Wellbeing  Outcome: Ongoing, Progressing  Goal: Readiness for Transition of Care  Outcome: Ongoing, Progressing     Problem: Behavior Regulation Impairment (Psychotic Signs/Symptoms)  Goal: Improved Behavioral Control (Psychotic Signs/Symptoms)  Outcome: Ongoing, Progressing     Problem: Cognitive Impairment (Psychotic Signs/Symptoms)  Goal: Optimal Cognitive Function (Psychotic Signs/Symptoms)  Outcome: Ongoing, Progressing     Problem: Decreased Participation and Engagement (Psychotic Signs/Symptoms)  Goal: Increased Participation and Engagement (Psychotic Signs/Symptoms)  Outcome: Ongoing, Progressing     Problem: Mood Impairment (Psychotic Signs/Symptoms)  Goal: Improved Mood Symptoms (Psychotic Signs/Symptoms)  Outcome: Ongoing, Progressing     Problem: Psychomotor Impairment (Psychotic Signs/Symptoms)  Goal: Improved Psychomotor Symptoms (Psychotic Signs/Symptoms)  Outcome: Ongoing, Progressing     Problem: Sensory Perception Impairment (Psychotic Signs/Symptoms)  Goal: Decreased Sensory Symptoms (Psychotic Signs/Symptoms)  Outcome: Ongoing, Progressing     Problem: Sleep Disturbance (Psychotic Signs/Symptoms)  Goal: Improved Sleep (Psychotic Signs/Symptoms)  Outcome: Ongoing, Progressing     Problem: Social, Occupational or Functional Impairment (Psychotic Signs/Symptoms)  Goal: Enhanced Social, Occupational or Functional Skills (Psychotic Signs/Symptoms)  Outcome: Ongoing, Progressing     Problem: Activity and Energy Impairment (Depressive Signs/Symptoms)  Goal: Optimized Energy Level (Depressive Signs/Symptoms)  Outcome: Ongoing, Progressing     Problem: Cognitive Impairment (Depressive Signs/Symptoms)  Goal: Optimized  Cognitive Function  Outcome: Ongoing, Progressing     Problem: Decreased Participation/Engagement (Depressive Signs/Symptoms)  Goal: Increased Participation and Engagement (Depressive Signs/Symptoms)  Outcome: Ongoing, Progressing     Problem: Feelings of Worthlessness, Hopelessness or Excessive Guilt (Depressive Signs/Symptoms)  Goal: Enhanced Self-Esteem and Confidence (Depressive Signs/Symptoms)  Outcome: Ongoing, Progressing

## 2024-02-20 PROCEDURE — 25000003 PHARM REV CODE 250: Performed by: PSYCHIATRY & NEUROLOGY

## 2024-02-20 PROCEDURE — 11400000 HC PSYCH PRIVATE ROOM

## 2024-02-20 RX ADMIN — ARIPIPRAZOLE 10 MG: 5 TABLET ORAL at 08:02

## 2024-02-20 RX ADMIN — ACETAMINOPHEN 650 MG: 325 TABLET, FILM COATED ORAL at 02:02

## 2024-02-20 RX ADMIN — TRAZODONE HYDROCHLORIDE 100 MG: 100 TABLET ORAL at 08:02

## 2024-02-20 RX ADMIN — HYDROXYZINE HYDROCHLORIDE 50 MG: 50 TABLET, FILM COATED ORAL at 08:02

## 2024-02-20 RX ADMIN — HYDROXYZINE HYDROCHLORIDE 50 MG: 50 TABLET, FILM COATED ORAL at 02:02

## 2024-02-20 RX ADMIN — ACETAMINOPHEN 650 MG: 325 TABLET, FILM COATED ORAL at 08:02

## 2024-02-20 RX ADMIN — ESCITALOPRAM OXALATE 10 MG: 10 TABLET ORAL at 08:02

## 2024-02-20 NOTE — PLAN OF CARE
Problem: Behavior Regulation Impairment (Psychotic Signs/Symptoms)  Goal: Improved Behavioral Control (Psychotic Signs/Symptoms)  Outcome: Ongoing, Progressing     Problem: Cognitive Impairment (Psychotic Signs/Symptoms)  Goal: Optimal Cognitive Function (Psychotic Signs/Symptoms)  Outcome: Ongoing, Progressing     Problem: Decreased Participation and Engagement (Psychotic Signs/Symptoms)  Goal: Increased Participation and Engagement (Psychotic Signs/Symptoms)  Outcome: Ongoing, Progressing

## 2024-02-20 NOTE — GROUP NOTE
Group Psychotherapy       Group Focus: Stress Management      Number of patients in attendance: 6    Group members completed a negative thought worksheet and practiced thought-stopping techniques for anxiety and intrusive thoughts. Patients not in attendance were given the worksheets completed in group.     Group Start Time: 1045  Group End Time:  1130  Groups Date: 2/20/2024  Group Topic:  Behavioral Health  Group Department: [facility]  Group Facilitators:  Lizette Smith SSW   _____________________________________________________________________    Patient Name: Ciro King  MRN: [id number]  Patient Class: IP- Psych   Admission Date\Time: 2/17/2024  2:04 AM  Hospital Length of Stay: 3  Primary Care Provider: Tanisha, Primary Doctor     Referred by: Acute Psychiatry Unit Treatment Team     Target symptoms: Depression     Patient's response to treatment: did not attend group     Progress toward goals:    Interval History:      Diagnosis:      Plan: Continue treatment on [facility]

## 2024-02-20 NOTE — NURSING
Pt is currently voicing no ADRs or physical complaints at this time, vital signs are stable, pt is not in any physical distress at this time,  Currently voicing no suicidal or homicidal ideations at this time, pt does seem very anxious and paranoid, seen by LISANDRO Jacobsen today continues to think he brain is burning, moving and swelling,  Voicing no detox symptoms at this time, pt is  Compliant with medication ordered, will monitor with suicide prec., for anger, psychosis and detox symptoms and will be assisted prn.

## 2024-02-20 NOTE — NURSING
Nursing assessment completed as charted. Patient was given PRN tylenol for headache 6/10. Patient was given PRN Atarax for anxiety and trazadone for sleep. Will continue to monitor patient. No other needs verbalized at this time.

## 2024-02-20 NOTE — NURSING
Patient complained of headache 5/10 at this time and was given PRN tylenol 650 mg and atarax 50 mg for anxiety. Will continue to monitor patient.

## 2024-02-20 NOTE — NURSING
"PRN Administration Note:    Behavior:    Patient (Ciro King is a 18 y.o. male, : 2005, MRN: [id number])     Allergies: Patient has no known allergies.    Ciro's  height is 5' 6" (1.676 m) and weight is 65.8 kg (145 lb). His temperature is 99.5 °F (37.5 °C). His blood pressure is 131/75 and his pulse is 102. His respiration is 16 and oxygen saturation is 96%.     Reason for PRN Administration: Complaints of headache, PS 7/10._________.    Intervention:    Administered _ Tylenol 650 mg.,_____ per physician order to Ciro       Response:    Ciro tolerated administration well.      Plan:     Continue to monitor per MD/PA/APRN orders; and reevaluate medication effectiveness within 30 minutes.    "

## 2024-02-20 NOTE — NURSING
"PRN Medication Follow-up Note:    Behavior:    Patient (Ciro King is a 18 y.o. male, : 2005, MRN: [id number])     Allergies: Patient has no known allergies.    Ciro's  height is 5' 6" (1.676 m) and weight is 65.8 kg (145 lb). His temperature is 99.5 °F (37.5 °C). His blood pressure is 131/75 and his pulse is 102. His respiration is 16 and oxygen saturation is 96%.     Administered _ Tylenol 650 mg.,_____ per physician order to Ciro       Intervention:    Intervention to Ciro's response: Administer medication as ordered.________.       Response:    Ciro's response:  Decreasing pain, PS 5/10._________      Plan:     Continue to monitor per MD/PA/APRN orders; and reevaluate medication effectiveness within 30 minutes.    "

## 2024-02-20 NOTE — NURSING
Pt with complaint of a headache, pt rec'd Tylenol  650 mg PO prn pain, will monitor for effectiveness

## 2024-02-20 NOTE — PROGRESS NOTES
"2/20/2024  Ciro King   2005   [id number]        Psychiatry Progress Note     Chief Complaint: "I feel like my brain is hot"    SUBJECTIVE:   Ciro King is a 18 y.o. male placed under a PEC at [facility] after presenting with hallucinations and suicidal thoughts.     Today patient states that he feels "bad". He continues to report odd feelings in his head. He states that his head feels "big inside" and he pointed to his nasal cavities and stated that it feels "hard in this area". States that he did not sleep more than about 3 - 4 hours last night. Will increase Trazodone to 150 mg to address ongoing insomnia. No signs of an infection so the feelings may be continued delusions but will monitor this closely.   Tolerating medications well without issue. Will continue with current POC and monitor for need to augment.     UDS: (-)  Blood alcohol: <10    Current Medications:   Scheduled Meds:    ARIPiprazole  10 mg Oral Daily    EScitalopram oxalate  10 mg Oral Daily      PRN Meds: acetaminophen, aluminum-magnesium hydroxide-simethicone, haloperidoL **AND** diphenhydrAMINE **AND** LORazepam **AND** haloperidol lactate **AND** diphenhydrAMINE **AND** lorazepam, hydrOXYzine HCL, traZODone   Psychotherapeutics (From admission, onward)      Start     Stop Route Frequency Ordered    02/20/24 0900  EScitalopram oxalate tablet 10 mg         -- Oral Daily 02/19/24 0919    02/19/24 1030  ARIPiprazole tablet 10 mg         -- Oral Daily 02/19/24 0919    02/17/24 0221  haloperidoL tablet 10 mg  (Med - Acute  Behavioral Management)        See Hyperspace for full Linked Orders Report.    -- Oral Every 4 hours PRN 02/17/24 0221    02/17/24 0221  LORazepam tablet 2 mg  (Med - Acute  Behavioral Management)        See Hyperspace for full Linked Orders Report.    -- Oral Every 4 hours PRN 02/17/24 0221    02/17/24 0221  haloperidol lactate injection 10 mg  (Med - Acute  Behavioral Management)        See " Hyperspace for full Linked Orders Report.    -- IM Every 4 hours PRN 02/17/24 0221    02/17/24 0221  LORazepam injection 2 mg  (Med - Acute  Behavioral Management)        See Hyperspace for full Linked Orders Report.    -- IM Every 4 hours PRN 02/17/24 0221    02/17/24 0221  traZODone tablet 100 mg         -- Oral Nightly PRN 02/17/24 0221            Allergies:   Review of patient's allergies indicates:  No Known Allergies     OBJECTIVE:   Vitals   Vitals:    02/20/24 0701   BP: 117/78   Pulse: 101   Resp: 16   Temp: 98.4 °F (36.9 °C)        Labs/Imaging/Studies:   Recent Results (from the past 36 hour(s))   Hemoglobin A1C    Collection Time: 02/19/24  7:24 AM   Result Value Ref Range    Hemoglobin A1c 4.9 <=7.0 %    Estimated Average Glucose 93.9 mg/dL   Comprehensive Metabolic Panel    Collection Time: 02/19/24  7:24 AM   Result Value Ref Range    Sodium Level 142 136 - 145 mmol/L    Potassium Level 4.9 3.5 - 5.1 mmol/L    Chloride 108 (H) 98 - 107 mmol/L    Carbon Dioxide 27 22 - 29 mmol/L    Glucose Level 89 74 - 100 mg/dL    Blood Urea Nitrogen 13.1 8.9 - 20.6 mg/dL    Creatinine 0.88 0.73 - 1.18 mg/dL    Calcium Level Total 9.7 8.4 - 10.2 mg/dL    Protein Total 6.9 6.4 - 8.3 gm/dL    Albumin Level 4.5 3.5 - 5.0 g/dL    Globulin 2.4 2.4 - 3.5 gm/dL    Albumin/Globulin Ratio 1.9 1.1 - 2.0 ratio    Bilirubin Total 1.0 <=1.5 mg/dL    Alkaline Phosphatase 80 40 - 150 unit/L    Alanine Aminotransferase 43 0 - 55 unit/L    Aspartate Aminotransferase 22 5 - 34 unit/L    eGFR >60 mls/min/1.73/m2   SYPHILIS ANTIBODY (WITH REFLEX RPR)    Collection Time: 02/19/24  7:24 AM   Result Value Ref Range    Syphilis Antibody Nonreactive Nonreactive, Equivocal   TSH    Collection Time: 02/19/24  7:24 AM   Result Value Ref Range    TSH 1.734 0.350 - 4.940 uIU/mL   Lipid Panel    Collection Time: 02/19/24  7:24 AM   Result Value Ref Range    Cholesterol Total 135 <=200 mg/dL    HDL Cholesterol 56 35 - 60 mg/dL    Triglyceride 42 34  "- 140 mg/dL    Cholesterol/HDL Ratio 2 0 - 5    Very Low Density Lipoprotein 8     LDL Cholesterol 71.00 50.00 - 140.00 mg/dL   CBC with Differential    Collection Time: 02/19/24  7:24 AM   Result Value Ref Range    WBC 7.35 4.50 - 11.50 x10(3)/mcL    RBC 5.85 4.70 - 6.10 x10(6)/mcL    Hgb 16.1 14.0 - 18.0 g/dL    Hct 49.1 42.0 - 52.0 %    MCV 83.9 80.0 - 94.0 fL    MCH 27.5 27.0 - 31.0 pg    MCHC 32.8 (L) 33.0 - 36.0 g/dL    RDW 13.1 11.5 - 17.0 %    Platelet 230 130 - 400 x10(3)/mcL    MPV 10.9 (H) 7.4 - 10.4 fL    Neut % 52.9 %    Lymph % 39.2 %    Mono % 6.4 %    Eos % 1.1 %    Basophil % 0.3 %    Lymph # 2.88 0.6 - 4.6 x10(3)/mcL    Neut # 3.89 2.1 - 9.2 x10(3)/mcL    Mono # 0.47 0.1 - 1.3 x10(3)/mcL    Eos # 0.08 0 - 0.9 x10(3)/mcL    Baso # 0.02 <=0.2 x10(3)/mcL    IG# 0.01 0 - 0.04 x10(3)/mcL    IG% 0.1 %    NRBC% 0.0 %          Medical Review Of Systems:  Constitutional: negative  Respiratory: negative  Cardiovascular: negative  Gastrointestinal: negative  Genitourinary:negative  Musculoskeletal:negative  Neurological: negative       Psychiatric Mental Status Exam:  General Appearance: appears stated age, well-developed, well-nourished  Arousal: alert  Behavior: cooperative  Movements and Motor Activity: no abnormal involuntary movements noted  Orientation: oriented to person, place, time, and situation  Speech: normal rate, normal rhythm, normal volume, normal tone  Mood: "I feel like my brain is hot"  Affect: constricted  Thought Process: grossly linear  Associations: intact  Thought Content and Perceptions: (+)hallucinations and delusions, passive suicidal ideation, no homicidal ideation  Recent and Remote Memory: recent memory intact, remote memory intact; per interview/observation with patient  Attention and Concentration: intact, attentive to conversation; per interview/observation with patient  Fund of Knowledge: intact, aware of current events, vocabulary appropriate; based on history, vocabulary, " fund of knowledge, syntax, grammar, and content  Insight: questionable; based on understanding of severity of illness and HPI  Judgment: questionable; based on patient's behavior and HPI     ASSESSMENT/PLAN:   Problems Addressed/Diagnoses:  Unspecified Psychotic Disorder (F29)  Unspecified Mood Disorder (F39)    No past medical history on file.     Plan:  Psychosis, acute  -Continue Abilify 10 mg daily    Mood disorder, acute  -Continue Lexapro 10 mg daily        Expected Disposition Plan: Home        Shemar Montes, JOHNP-BC

## 2024-02-21 PROCEDURE — 11400000 HC PSYCH PRIVATE ROOM

## 2024-02-21 PROCEDURE — 25000003 PHARM REV CODE 250: Performed by: PSYCHIATRY & NEUROLOGY

## 2024-02-21 RX ORDER — ARIPIPRAZOLE 5 MG/1
15 TABLET ORAL DAILY
Status: DISCONTINUED | OUTPATIENT
Start: 2024-02-22 | End: 2024-02-23 | Stop reason: HOSPADM

## 2024-02-21 RX ORDER — ESCITALOPRAM OXALATE 10 MG/1
20 TABLET ORAL DAILY
Status: DISCONTINUED | OUTPATIENT
Start: 2024-02-22 | End: 2024-02-23 | Stop reason: HOSPADM

## 2024-02-21 RX ORDER — HYDROXYZINE HYDROCHLORIDE 50 MG/1
50 TABLET, FILM COATED ORAL 3 TIMES DAILY
Status: DISCONTINUED | OUTPATIENT
Start: 2024-02-21 | End: 2024-02-23 | Stop reason: HOSPADM

## 2024-02-21 RX ADMIN — ACETAMINOPHEN 650 MG: 325 TABLET, FILM COATED ORAL at 02:02

## 2024-02-21 RX ADMIN — ESCITALOPRAM OXALATE 10 MG: 10 TABLET ORAL at 08:02

## 2024-02-21 RX ADMIN — TRAZODONE HYDROCHLORIDE 150 MG: 50 TABLET ORAL at 08:02

## 2024-02-21 RX ADMIN — HYDROXYZINE HYDROCHLORIDE 50 MG: 50 TABLET, FILM COATED ORAL at 02:02

## 2024-02-21 RX ADMIN — ACETAMINOPHEN 650 MG: 325 TABLET, FILM COATED ORAL at 08:02

## 2024-02-21 RX ADMIN — ACETAMINOPHEN 650 MG: 325 TABLET, FILM COATED ORAL at 09:02

## 2024-02-21 RX ADMIN — ARIPIPRAZOLE 10 MG: 5 TABLET ORAL at 08:02

## 2024-02-21 RX ADMIN — HYDROXYZINE HYDROCHLORIDE 50 MG: 50 TABLET, FILM COATED ORAL at 08:02

## 2024-02-21 NOTE — PROGRESS NOTES
"2/21/2024  Ciro King   2005   [id number]        Psychiatry Progress Note     Chief Complaint: "I feel like my brain is hot"    SUBJECTIVE:   Ciro King is a 18 y.o. male placed under a PEC at [facility] after presenting with hallucinations and suicidal thoughts.     Continues to report that his head is burning per staff.  Attends groups but passive participation.  States that he was in the bathroom and "saw colors."  States, "What's the point of living if I feel like this."  Will increase Lexapro and Abilify today and will monitor progress.    Anxious.  He also states that he thinks frequently about when he was younger and his family was mean to him.  Still feels like something is moving inside of his head as well as burning in his back.    Poor sleep.  Taking trazodone and Atarax.    UDS: (-)  Blood alcohol: <10    Current Medications:   Scheduled Meds:    ARIPiprazole  10 mg Oral Daily    EScitalopram oxalate  10 mg Oral Daily      PRN Meds: acetaminophen, aluminum-magnesium hydroxide-simethicone, haloperidoL **AND** diphenhydrAMINE **AND** LORazepam **AND** haloperidol lactate **AND** diphenhydrAMINE **AND** lorazepam, hydrOXYzine HCL, traZODone   Psychotherapeutics (From admission, onward)      Start     Stop Route Frequency Ordered    02/20/24 0900  EScitalopram oxalate tablet 10 mg         -- Oral Daily 02/19/24 0919    02/19/24 1030  ARIPiprazole tablet 10 mg         -- Oral Daily 02/19/24 0919    02/17/24 0221  haloperidoL tablet 10 mg  (Med - Acute  Behavioral Management)        See Hyperspace for full Linked Orders Report.    -- Oral Every 4 hours PRN 02/17/24 0221    02/17/24 0221  LORazepam tablet 2 mg  (Med - Acute  Behavioral Management)        See Hyperspace for full Linked Orders Report.    -- Oral Every 4 hours PRN 02/17/24 0221    02/17/24 0221  haloperidol lactate injection 10 mg  (Med - Acute  Behavioral Management)        See Hyperspace for full Linked Orders " "Report.    -- IM Every 4 hours PRN 02/17/24 0221 02/17/24 0221  LORazepam injection 2 mg  (Med - Acute  Behavioral Management)        See Hyperspace for full Linked Orders Report.    -- IM Every 4 hours PRN 02/17/24 0221 02/17/24 0221  traZODone tablet 100 mg         -- Oral Nightly PRN 02/17/24 0221            Allergies:   Review of patient's allergies indicates:  No Known Allergies     OBJECTIVE:   Vitals   Vitals:    02/21/24 0701   BP: 125/78   Pulse: 99   Resp: 18   Temp: 97.9 °F (36.6 °C)        Labs/Imaging/Studies:   No results found for this or any previous visit (from the past 36 hour(s)).         Medical Review Of Systems:  Constitutional: negative  Respiratory: negative  Cardiovascular: negative  Gastrointestinal: negative  Genitourinary:negative  Musculoskeletal:negative  Neurological: negative       Psychiatric Mental Status Exam:  General Appearance: appears stated age, well-developed, well-nourished  Arousal: alert  Behavior: cooperative  Movements and Motor Activity: no abnormal involuntary movements noted  Orientation: oriented to person, place, time, and situation  Speech: normal rate, normal rhythm, normal volume, normal tone  Mood: "I feel bad"  Affect: constricted  Thought Process: grossly linear  Associations: intact  Thought Content and Perceptions: (+)hallucinations and somatic delusions, passive suicidal ideation, no homicidal ideation  Recent and Remote Memory: recent memory intact, remote memory intact; per interview/observation with patient  Attention and Concentration: intact, attentive to conversation; per interview/observation with patient  Fund of Knowledge: intact, aware of current events, vocabulary appropriate; based on history, vocabulary, fund of knowledge, syntax, grammar, and content  Insight: questionable; based on understanding of severity of illness and HPI  Judgment: questionable; based on patient's behavior and HPI    ASSESSMENT/PLAN:   Problems " Addressed/Diagnoses:  Unspecified Psychotic Disorder (F29)  Unspecified Mood Disorder (F39)  Anxiety (F41.9)  Insomnia (F51.01)    No past medical history on file.     Plan:  Psychosis, acute  -Increase Abilify 15mg daily    Mood disorder, acute  -Increase Lexapro to 20mg daily    Anxiety, acute  -Hydroxyzine 50mg TID    Insomnia, acute  -Increase trazodone to 150mg PRN      Expected Disposition Plan: Home        Korey De La Torre M.D.

## 2024-02-21 NOTE — PLAN OF CARE
Problem: Behavior Regulation Impairment (Psychotic Signs/Symptoms)  Goal: Improved Behavioral Control (Psychotic Signs/Symptoms)  Outcome: Ongoing, Progressing     Problem: Cognitive Impairment (Psychotic Signs/Symptoms)  Goal: Optimal Cognitive Function (Psychotic Signs/Symptoms)  Outcome: Ongoing, Progressing     Problem: Decreased Participation and Engagement (Psychotic Signs/Symptoms)  Goal: Increased Participation and Engagement (Psychotic Signs/Symptoms)  Outcome: Ongoing, Not Progressing     Problem: Mood Impairment (Psychotic Signs/Symptoms)  Goal: Improved Mood Symptoms (Psychotic Signs/Symptoms)  Outcome: Ongoing, Not Progressing     Problem: Psychomotor Impairment (Psychotic Signs/Symptoms)  Goal: Improved Psychomotor Symptoms (Psychotic Signs/Symptoms)  Outcome: Ongoing, Not Progressing     Problem: Sleep Disturbance (Psychotic Signs/Symptoms)  Goal: Improved Sleep (Psychotic Signs/Symptoms)  Outcome: Ongoing, Not Progressing

## 2024-02-21 NOTE — NURSING
Nursing assessment completed as charted. Patient was given PRN trazadone 100 mg for sleep and atarax 50 mg for anxiety. Will continue to monitor

## 2024-02-21 NOTE — PROGRESS NOTES
Aromatherapy and Relaxation/medication education group Co-facilitated with Kamille Najera LPN   02/21/24 1400   [facility] Group Therapy   Group Name Therapeutic Recreation   Specific Interventions Relaxation Training   Participation Level Active;Supportive;Attentive;Sharing;Appropriate   Participation Quality Cooperative   Insight/Motivation Improved   Affect/Mood Display Blunted;Flat   Cognition Alert;RIS   Psychomotor WNL

## 2024-02-21 NOTE — PROGRESS NOTES
02/21/24 1600   [facility] Group Therapy   Group Name Therapeutic Recreation   Specific Interventions Skilled Activity Leisure Education and Awareness   Participation Level Supportive;Attentive;Appropriate;Sharing   Participation Quality Cooperative  (c/o headache)   Insight/Motivation Improved   Affect/Mood Display Blunted   Cognition RIS;Alert   Psychomotor WNL

## 2024-02-21 NOTE — PLAN OF CARE
"Ciro passively attends TR groups, is quiet and observant, reporting c/o headache, interacts well with peers and staff if asked questions but does not initiate social situations, and attends his ADL's.    Ciro attended treatment team, was cooperative reporting c/o "Headache, fire on my body, seeing colours" reporting +RIS and body tension, and slowly progressing towards treatment goals.  "

## 2024-02-21 NOTE — PLAN OF CARE
Problem: Adult Inpatient Plan of Care  Goal: Plan of Care Review  Outcome: Ongoing, Progressing  Goal: Patient-Specific Goal (Individualized)  Outcome: Ongoing, Progressing  Goal: Absence of Hospital-Acquired Illness or Injury  Outcome: Ongoing, Progressing  Goal: Optimal Comfort and Wellbeing  Outcome: Ongoing, Progressing  Goal: Readiness for Transition of Care  Outcome: Ongoing, Progressing     Problem: Behavior Regulation Impairment (Psychotic Signs/Symptoms)  Goal: Improved Behavioral Control (Psychotic Signs/Symptoms)  Outcome: Ongoing, Progressing     Problem: Cognitive Impairment (Psychotic Signs/Symptoms)  Goal: Optimal Cognitive Function (Psychotic Signs/Symptoms)  Outcome: Ongoing, Progressing     Problem: Decreased Participation and Engagement (Psychotic Signs/Symptoms)  Goal: Increased Participation and Engagement (Psychotic Signs/Symptoms)  Outcome: Ongoing, Progressing     Problem: Mood Impairment (Psychotic Signs/Symptoms)  Goal: Improved Mood Symptoms (Psychotic Signs/Symptoms)  Outcome: Ongoing, Progressing     Problem: Psychomotor Impairment (Psychotic Signs/Symptoms)  Goal: Improved Psychomotor Symptoms (Psychotic Signs/Symptoms)  Outcome: Ongoing, Progressing     Problem: Sensory Perception Impairment (Psychotic Signs/Symptoms)  Goal: Decreased Sensory Symptoms (Psychotic Signs/Symptoms)  Outcome: Ongoing, Progressing     Problem: Sleep Disturbance (Psychotic Signs/Symptoms)  Goal: Improved Sleep (Psychotic Signs/Symptoms)  Outcome: Ongoing, Progressing     Problem: Social, Occupational or Functional Impairment (Psychotic Signs/Symptoms)  Goal: Enhanced Social, Occupational or Functional Skills (Psychotic Signs/Symptoms)  Outcome: Ongoing, Progressing     Problem: Activity and Energy Impairment (Depressive Signs/Symptoms)  Goal: Optimized Energy Level (Depressive Signs/Symptoms)  Outcome: Ongoing, Progressing     Problem: Cognitive Impairment (Depressive Signs/Symptoms)  Goal: Optimized  Cognitive Function  Outcome: Ongoing, Progressing     Problem: Decreased Participation/Engagement (Depressive Signs/Symptoms)  Goal: Increased Participation and Engagement (Depressive Signs/Symptoms)  Outcome: Ongoing, Progressing     Problem: Feelings of Worthlessness, Hopelessness or Excessive Guilt (Depressive Signs/Symptoms)  Goal: Enhanced Self-Esteem and Confidence (Depressive Signs/Symptoms)  Outcome: Ongoing, Progressing     Problem: Mood Impairment (Depressive Signs/Symptoms)  Goal: Improved Mood Symptoms (Depressive Signs/Symptoms)  Outcome: Ongoing, Progressing     Problem: Nutrition Imbalance (Depressive Signs/Symptoms)  Goal: Optimized Nutrition Intake  Outcome: Ongoing, Progressing     Problem: Psychomotor Impairment (Depressive Signs/Symptoms)  Goal: Improved Psychomotor Symptoms (Depressive Signs/Symptoms)  Outcome: Ongoing, Progressing     Problem: Sleep Disturbance (Depressive Signs/Symptoms)  Goal: Improved Sleep (Depressive Signs/Symptoms)  Outcome: Ongoing, Progressing     Problem: Social, Occupational or Functional Impairment (Depressive Signs/Symptoms)  Goal: Enhanced Social, Occupational or Functional Skills (Depressive Signs/Symptoms)  Outcome: Ongoing, Progressing     Problem: Violence Risk or Actual  Goal: Anger and Impulse Control  Outcome: Ongoing, Progressing

## 2024-02-21 NOTE — NURSING
"PRN Administration Note:    Behavior:    Patient (Ciro King is a 18 y.o. male, : 2005, MRN: [id number])     Allergies: Patient has no known allergies.    Ciro's  height is 5' 6" (1.676 m) and weight is 65.8 kg (145 lb). His temperature is 97.9 °F (36.6 °C). His blood pressure is 125/78 and his pulse is 99. His respiration is 18 and oxygen saturation is 97%.     Reason for PRN Administration: headache.    Intervention:    Administered Tylenol 650 mg PO PRN per physician order to Ciro       Response:    Ciro tolerated administration well.      Plan:     Continue to monitor per MD/PA/APRN orders; and reevaluate medication effectiveness within 30 minutes.    "

## 2024-02-21 NOTE — PROGRESS NOTES
02/21/24 1500   [facility] Group Therapy   Group Name Therapeutic Recreation   Specific Interventions Skilled Activity Leisure Education and Awareness   Participation Level Supportive;Appropriate;Attentive;Sharing   Participation Quality Cooperative  (c/o headache)   Insight/Motivation Improved   Affect/Mood Display Blunted   Cognition RIS;Alert   Psychomotor WNL

## 2024-02-21 NOTE — NURSING
"PRN Medication Follow-up Note:    Behavior:    Patient (Ciro King is a 18 y.o. male, : 2005, MRN: [id number])     Allergies: Patient has no known allergies.    Donnas  height is 5' 6" (1.676 m) and weight is 65.8 kg (145 lb). His temperature is 97.9 °F (36.6 °C). His blood pressure is 125/78 and his pulse is 99. His respiration is 18 and oxygen saturation is 97%.     Administered Tylenol 650 mg PO PRN per physician order to Ciro       Intervention:    Intervention to Ciro's response: relief of headache.      Response:    Ciro's response: relief of headache.      Plan:     Continue to monitor per MD/PA/APRN orders; and reevaluate medication effectiveness within 30 minutes.    "

## 2024-02-21 NOTE — NURSING
Treatment Team Note:      Behavior:    Patient (Ciro King is a 18 y.o. male, : 2005, MRN: [id number]) demonstrating an affect that was sad, flat, and anxious. Ciro demonstrating mood that is depressed, anxious, and swings. Ciro had an appearance that was clean. Ciro denies suicidal ideation. Ciro denies suicide plan. Ciro endorses hallucinations.      Intervention:    Encourage Ciro to perform self-hygiene, grooming, and changing of clothing. Encourage Ciro to attend all scheduled groups. Monitor Ciro's behavior and program compliance. Monitor Ciro for suicidal ideation, homicidal ideation, sleep disturbance, and hallucinations. Encourage Ciro to eat all portions of meals and assess for meal preferences. Monitor Ciro for intake and output to ensure hydration. Notify the Physician/Physician Assistant/Advance Practice Registered Nurse (MD/PA/APRN) for any medication refusal and any change in patient condition.    Discussed with Ciro course of treatment. Discussed with Ciro medications ordered and schedule of medications. Discussed collateral contact with Ciro.      Response:    Ciro's response to treatment team meeting: cooperative. Soft spoken. States he hears his own voice speaking to him in his head. Tactile hallucinations AEB he feels like something is moving in his brain.      Plan:     Continue to monitor per MD/PA/APRN orders; maintain patient safety.

## 2024-02-21 NOTE — GROUP NOTE
Group Psychotherapy       Group Focus: Life Skills      Number of patients in attendance: 8    Group focused on benefits of improved life skills, based on DBT skills manual. Explored behaviors patients would like to decrease and skills to improve. Goals worksheet was given to participants and patients not participating in group.     Group Start Time: 1045  Group End Time:  1130  Groups Date: 2/21/2024  Group Topic:  Behavioral Health  Group Department: [facility] - Behavioral Health Unit  Group Facilitators:  Lizette Smith SSW   _____________________________________________________________________    Patient Name: Ciro King  MRN: [id number]  Patient Class: IP- Psych   Admission Date\Time: 2/17/2024  2:04 AM  Hospital Length of Stay: 4  Primary Care Provider: Tanisha, Primary Doctor     Referred by: Acute Psychiatry Unit Treatment Team     Target symptoms: Depression     Patient's response to treatment: Active Listening and Self-disclosure     Progress toward goals: Progressing adequately     Interval History:     Diagnosis:      Plan: Continue treatment on APU

## 2024-02-21 NOTE — CARE UPDATE
Treatment Team    Pt seem for treatment team today with interdisciplinary team.  Pt is Cooperative and Anxious with Tx team. Pt reports symptoms at this time. MD changed pt meds at this time. Treatment teams goals Not met at this time. Pt DC plan is home. DC date scheduled for 2.24.2024.

## 2024-02-21 NOTE — NURSING
Nursing assessment completed as charted. Patient was given PRN atarax for anxiety and PRN trazadone for sleep. Will continue to monitor

## 2024-02-21 NOTE — NURSING
Patient was given PRN tylenol for headache 5/10 at this time and PRN atarax 50 mg for anxiety. Will continue to monitor

## 2024-02-22 PROCEDURE — 11400000 HC PSYCH PRIVATE ROOM

## 2024-02-22 PROCEDURE — 25000003 PHARM REV CODE 250: Performed by: PSYCHIATRY & NEUROLOGY

## 2024-02-22 RX ADMIN — ARIPIPRAZOLE 15 MG: 5 TABLET ORAL at 08:02

## 2024-02-22 RX ADMIN — ACETAMINOPHEN 650 MG: 325 TABLET, FILM COATED ORAL at 11:02

## 2024-02-22 RX ADMIN — HYDROXYZINE HYDROCHLORIDE 50 MG: 50 TABLET, FILM COATED ORAL at 02:02

## 2024-02-22 RX ADMIN — ACETAMINOPHEN 650 MG: 325 TABLET, FILM COATED ORAL at 08:02

## 2024-02-22 RX ADMIN — HYDROXYZINE HYDROCHLORIDE 50 MG: 50 TABLET, FILM COATED ORAL at 08:02

## 2024-02-22 RX ADMIN — ESCITALOPRAM OXALATE 20 MG: 10 TABLET ORAL at 08:02

## 2024-02-22 NOTE — NURSING
"PRN Medication Follow-up Note:    Behavior:    Patient (Ciro King is a 18 y.o. male, : 2005, MRN: [id number])     Allergies: Patient has no known allergies.    Ciro's  height is 5' 6" (1.676 m) and weight is 65.8 kg (145 lb). His temperature is 97.9 °F (36.6 °C). His blood pressure is 125/78 and his pulse is 99. His respiration is 18 and oxygen saturation is 97%.     Administered Trazodone 100 mg po and Tylenol 650 mg po per physician order to Ciro       Intervention:    Intervention to Ciro's response: To relieve headache and promote sleep.       Response:    Ciro's response: Effective      Plan:     Continue to monitor per MD/PA/APRN orders; and reevaluate medication effectiveness within 30 minutes.   "

## 2024-02-22 NOTE — NURSING
"PRN Administration Note:    Behavior:    Patient (Ciro King is a 18 y.o. male, : 2005, MRN: [id number])     Allergies: Patient has no known allergies.    Ciro's  height is 5' 6" (1.676 m) and weight is 65.8 kg (145 lb). His temperature is 97.9 °F (36.6 °C). His blood pressure is 125/78 and his pulse is 99. His respiration is 18 and oxygen saturation is 97%.     Reason for PRN Administration: Insomnia and Headache.    Intervention:    Administered Trazodone 100 mg po and Tylenol 650 mg po per physician order to Ciro       Response:    Ciro tolerated administration well.      Plan:     Continue to monitor per MD/PA/APRN orders; and reevaluate medication effectiveness within 30 minutes.   "

## 2024-02-22 NOTE — NURSING
"Daily Nursing Note:      Behavior:    Patient (Ciro King is a 18 y.o. male, : 2005, MRN: [id number]) demonstrating an affect that was sad, flat, anxious, and irritable. Ciro demonstrating mood that is depressed and anxious. Ciro had an appearance that was clean. Ciro denies suicidal ideation. Ciro denies suicide plan. Ciro denies homicidal ideation. Ciro endorses hallucinations.    Ciro's  height is 5' 6" (1.676 m) and weight is 65.8 kg (145 lb). His temperature is 97.7 °F (36.5 °C). His blood pressure is 116/77 and his pulse is 95. His respiration is 18 and oxygen saturation is 97%.     Ciro's last BM was noted on: 24.      Intervention:    Encourage Ciro to perform self-hygiene, grooming, and changing of clothing. Monitor Ciro's behavior and program compliance. Monitor Ciro for suicidal ideation, homicidal ideation, sleep disturbance, and hallucinations. Encourage Ciro to eat all portions of meals and assess for meal preferences. Monitor Ciro for intake and output to ensure hydration. Notify the Physician/Physician Assistant/Advance Practice Registered Nurse (MD/PA/APRN) for any medication refusal and any change in patient condition.      Response:    Ciro verbalizes understand of unit process and procedures.       Plan:     Continue to monitor per MD/PA/APRN orders; maintain patient safety.    "

## 2024-02-22 NOTE — PLAN OF CARE
02/22/24 1400   [facility] Group Therapy   Group Name Medication   Specific Interventions Other (see comments)  (Adherence)   Participation Level Active;Attentive   Participation Quality Cooperative   Insight/Motivation Improved   Affect/Mood Display Appropriate   Cognition Alert;Oriented   Psychomotor WNL

## 2024-02-22 NOTE — PROGRESS NOTES
"2/22/2024  Ciro King   2005   [id number]        Psychiatry Progress Note     Chief Complaint: "I feel like my brain is hot"    SUBJECTIVE:   Ciro King is a 18 y.o. male placed under a PEC at [facility] after presenting with hallucinations and suicidal thoughts.     Today patient states that he feels . Continues to report that his head is burning per staff.  Remains anxious. He continues to endorse burning in his head and states that he hears hisself "louder". He also states that he is experiencing racing thoughts. Poor sleep continues. Abilify was increased today so will monitor for improvement.       UDS: (-)  Blood alcohol: <10    Current Medications:   Scheduled Meds:    ARIPiprazole  15 mg Oral Daily    EScitalopram oxalate  20 mg Oral Daily    hydrOXYzine HCL  50 mg Oral TID      PRN Meds: acetaminophen, aluminum-magnesium hydroxide-simethicone, haloperidoL **AND** diphenhydrAMINE **AND** LORazepam **AND** haloperidol lactate **AND** diphenhydrAMINE **AND** lorazepam, hydrOXYzine HCL, traZODone   Psychotherapeutics (From admission, onward)      Start     Stop Route Frequency Ordered    02/22/24 0900  EScitalopram oxalate tablet 20 mg         -- Oral Daily 02/21/24 0940    02/22/24 0900  ARIPiprazole tablet 15 mg         -- Oral Daily 02/21/24 0940    02/21/24 0940  traZODone tablet 150 mg         -- Oral Nightly PRN 02/21/24 0940    02/17/24 0221  haloperidoL tablet 10 mg  (Med - Acute  Behavioral Management)        See Hyperspace for full Linked Orders Report.    -- Oral Every 4 hours PRN 02/17/24 0221    02/17/24 0221  LORazepam tablet 2 mg  (Med - Acute  Behavioral Management)        See Hyperspace for full Linked Orders Report.    -- Oral Every 4 hours PRN 02/17/24 0221    02/17/24 0221  haloperidol lactate injection 10 mg  (Med - Acute  Behavioral Management)        See Hyperspace for full Linked Orders Report.    -- IM Every 4 hours PRN 02/17/24 0221    02/17/24 0221  " "LORazepam injection 2 mg  (Med - Acute  Behavioral Management)        See Hyperspace for full Linked Orders Report.    -- IM Every 4 hours PRN 02/17/24 0221            Allergies:   Review of patient's allergies indicates:  No Known Allergies     OBJECTIVE:   Vitals   Vitals:    02/22/24 0701   BP: 116/77   Pulse: 95   Resp: 18   Temp: 97.7 °F (36.5 °C)        Labs/Imaging/Studies:   No results found for this or any previous visit (from the past 36 hour(s)).         Medical Review Of Systems:  Constitutional: negative  Respiratory: negative  Cardiovascular: negative  Gastrointestinal: negative  Genitourinary:negative  Musculoskeletal:negative  Neurological: negative       Psychiatric Mental Status Exam:  General Appearance: appears stated age, well-developed, well-nourished  Arousal: alert  Behavior: cooperative  Movements and Motor Activity: no abnormal involuntary movements noted  Orientation: oriented to person, place, time, and situation  Speech: normal rate, normal rhythm, normal volume, normal tone  Mood: "I still feel bad"  Affect: constricted  Thought Process: grossly linear  Associations: intact  Thought Content and Perceptions: (+) hallucinations and somatic delusions, passive suicidal ideation improved, no homicidal ideation  Recent and Remote Memory: recent memory intact, remote memory intact; per interview/observation with patient  Attention and Concentration: intact, attentive to conversation; per interview/observation with patient  Fund of Knowledge: intact, aware of current events, vocabulary appropriate; based on history, vocabulary, fund of knowledge, syntax, grammar, and content  Insight: questionable; based on understanding of severity of illness and HPI  Judgment: questionable; based on patient's behavior and HPI    ASSESSMENT/PLAN:   Problems Addressed/Diagnoses:  Unspecified Psychotic Disorder (F29)  Unspecified Mood Disorder (F39)  Anxiety (F41.9)  Insomnia (F51.01)    No past medical history " on file.     Plan:  Psychosis, acute  -Abilify 15 mg daily    Mood disorder, acute  -Lexapro 20 mg daily    Anxiety, acute  -Hydroxyzine 50mg TID    Insomnia, acute  -trazodone 150 mg PRN      Expected Disposition Plan: Home        Shemar Montes, PMHNP-BC

## 2024-02-22 NOTE — GROUP NOTE
Group Psychotherapy       Group Focus: Life Skills    Group topic: Coping Skills: Therapist explored patients need for increase in effective coping skills to deal with stress or anxiety. Patients were given the opportunity to explore their own stressors and develop short term goals.     Number of patients in attendance: 4    Group Start Time: 1045  Group End Time:  1130  Groups Date: 2/22/2024  Group Topic:  Behavioral Health  Group Department: [facility] - Behavioral Health Unit  Group Facilitators:  Kathe Douglass MSW  _____________________________________________________________________    Patient Name: Ciro King  MRN: [id number]  Patient Class: IP- Psych   Admission Date\Time: 2/17/2024  2:04 AM  Hospital Length of Stay: 5  Primary Care Provider: Tanisha, Primary Doctor     Referred by: Acute Psychiatry Unit Treatment Team     Target symptoms: Depression and Psychosis     Patient's response to treatment: Active Listening, Self-disclosure, and Frequent Questions     Progress toward goals: Progressing well     Interval History:      Diagnosis:      Plan: Continue treatment on AP[facility]     Statement Selected

## 2024-02-22 NOTE — NURSING
"Daily Nursing Note:      Behavior:    Patient (Ciro King is a 18 y.o. male, : 2005, MRN: [id number]) demonstrating an affect that was sad, flat, and anxious. Ciro demonstrating mood that is depressed and anxious. Ciro had an appearance that was clean. Ciro denies suicidal ideation. Ciro denies suicide plan. Ciro denies homicidal ideation. Ciro endorses hallucinations.    Ciro's  height is 5' 6" (1.676 m) and weight is 65.8 kg (145 lb). His temperature is 97.9 °F (36.6 °C). His blood pressure is 125/78 and his pulse is 99. His respiration is 18 and oxygen saturation is 97%.       Intervention:    Encourage Ciro to perform self-hygiene, grooming, and changing of clothing. Monitor Ciro's behavior and program compliance. Monitor Ciro for suicidal ideation, homicidal ideation, sleep disturbance, and hallucinations. Encourage Ciro to eat all portions of meals and assess for meal preferences. Monitor Ciro for intake and output to ensure hydration. Notify the Physician/Physician Assistant/Advance Practice Registered Nurse (MD/PA/APRN) for any medication refusal and any change in patient condition.      Response:    Ciro verbalizes understand of unit process and procedures.      Plan:     Continue to monitor per MD/PA/APRN orders; maintain patient safety.   "

## 2024-02-23 VITALS
HEIGHT: 66 IN | HEART RATE: 132 BPM | SYSTOLIC BLOOD PRESSURE: 123 MMHG | BODY MASS INDEX: 23.3 KG/M2 | WEIGHT: 145 LBS | TEMPERATURE: 98 F | DIASTOLIC BLOOD PRESSURE: 84 MMHG | OXYGEN SATURATION: 96 % | RESPIRATION RATE: 18 BRPM

## 2024-02-23 PROBLEM — F32.A DEPRESSION: Status: ACTIVE | Noted: 2024-02-23

## 2024-02-23 PROBLEM — F41.9 ANXIETY DISORDER, UNSPECIFIED: Status: ACTIVE | Noted: 2024-02-23

## 2024-02-23 PROCEDURE — 25000003 PHARM REV CODE 250: Performed by: PSYCHIATRY & NEUROLOGY

## 2024-02-23 RX ORDER — ESCITALOPRAM OXALATE 20 MG/1
20 TABLET ORAL DAILY
Qty: 30 TABLET | Refills: 0 | Status: SHIPPED | OUTPATIENT
Start: 2024-02-24 | End: 2024-03-25

## 2024-02-23 RX ORDER — ARIPIPRAZOLE 15 MG/1
15 TABLET ORAL DAILY
Qty: 30 TABLET | Refills: 0 | Status: SHIPPED | OUTPATIENT
Start: 2024-02-24 | End: 2024-03-25

## 2024-02-23 RX ORDER — TRAZODONE HYDROCHLORIDE 150 MG/1
150 TABLET ORAL NIGHTLY PRN
Qty: 30 TABLET | Refills: 0 | Status: SHIPPED | OUTPATIENT
Start: 2024-02-23 | End: 2024-03-24

## 2024-02-23 RX ADMIN — HYDROXYZINE HYDROCHLORIDE 50 MG: 50 TABLET, FILM COATED ORAL at 12:02

## 2024-02-23 RX ADMIN — TRAZODONE HYDROCHLORIDE 150 MG: 50 TABLET ORAL at 12:02

## 2024-02-23 RX ADMIN — ESCITALOPRAM OXALATE 20 MG: 10 TABLET ORAL at 09:02

## 2024-02-23 RX ADMIN — ACETAMINOPHEN 650 MG: 325 TABLET, FILM COATED ORAL at 11:02

## 2024-02-23 RX ADMIN — HYDROXYZINE HYDROCHLORIDE 50 MG: 50 TABLET, FILM COATED ORAL at 09:02

## 2024-02-23 RX ADMIN — ARIPIPRAZOLE 15 MG: 5 TABLET ORAL at 09:02

## 2024-02-23 NOTE — CARE UPDATE
Transportation has been set up via [facility]. Patient is going to [address] [place] [address] in [place]

## 2024-02-23 NOTE — PLAN OF CARE
Ciro met reported treatment goals of coping skills and to decrease anxiety.  CTRS Discharge Recommendations:  Encouraged Pt. to actively utilize available community resources to increase leisure involvement to decrease signs and symptoms of illness.  Encouraged Pt. to utilize coping skills on a regular basis to reduce the risk of decomposition and re-hospitalization.

## 2024-02-23 NOTE — NURSING
"PRN Medication Follow-up Note:    Behavior:    Patient (Ciro King is a 18 y.o. male, : 2005, MRN: [id number])     Allergies: Patient has no known allergies.    Donnas  height is 5' 6" (1.676 m) and weight is 65.8 kg (145 lb). His temperature is 97.7 °F (36.5 °C). His blood pressure is 116/77 and his pulse is 95. His respiration is 18 and oxygen saturation is 97%.     Administered Hydroxyzine 50 mg per physician order to Ciro for anxiety.      Intervention:    Intervention to Ciro's response: None required.       Response:    Ciro's response: Reports decrease in anxiety.       Plan:     Continue to monitor per MD/PA/APRN orders; and reevaluate medication effectiveness within 30 minutes.    "

## 2024-02-23 NOTE — NURSING
Patient c/o headache 10/10 on pain scale. Acetaminophen 650 mg po given. Will monitor for the effectiveness of medication.

## 2024-02-23 NOTE — NURSING
"PRN Medication Follow-up Note:    Behavior:    Patient (Ciro King is a 18 y.o. male, : 2005, MRN: [id number])     Allergies: Patient has no known allergies.    Donnas  height is 5' 6" (1.676 m) and weight is 65.8 kg (145 lb). His temperature is 97.7 °F (36.5 °C). His blood pressure is 116/77 and his pulse is 95. His respiration is 18 and oxygen saturation is 97%.     Administered Acetaminophen 650 mg per physician order to Ciro for headache.      Intervention:    Intervention to Ciro's response: None required.       Response:    Ciro's response: No further c/o pain.      Plan:     Continue to monitor per MD/PA/APRN orders; and reevaluate medication effectiveness within 30 minutes.    "

## 2024-02-23 NOTE — NURSING
Discharge Note:    Ciro King is a 18 y.o. male, : 2005, MRN: [id number], admitted on 2024 for Korey De La Torre MD with a diagnosis of Depression [F32.A].    Patient discharged on 2024 per physician orders in stable condition. Patient denied suicidal ideation, homicidal ideation, or hallucinations. Patient was discharged with valuables, personal belongings, prescriptions, discharge instructions, and an educational handout explaining the diagnosis and prescribed medications. Patient verbalized understanding of the discharge instructions and importance of follow-up visits. Patient was escorted out of the facility by [facility] and placed into a secure transport vehicle to be transported to home.     Patient discharged on the following medications:     Medication List        START taking these medications      ARIPiprazole 15 MG Tab  Commonly known as: ABILIFY  Take 1 tablet (15 mg total) by mouth once daily.  Start taking on: 2024     EScitalopram oxalate 20 MG tablet  Commonly known as: LEXAPRO  Take 1 tablet (20 mg total) by mouth once daily.  Start taking on: 2024     traZODone 150 MG tablet  Commonly known as: DESYREL  Take 1 tablet (150 mg total) by mouth nightly as needed for Insomnia.               Where to Get Your Medications        You can get these medications from any pharmacy    Bring a paper prescription for each of these medications  ARIPiprazole 15 MG Tab  EScitalopram oxalate 20 MG tablet  traZODone 150 MG tablet

## 2024-02-23 NOTE — NURSING
"Daily Nursing Note:      Behavior:    Patient (Ciro King is a 18 y.o. male, : 2005, MRN: [id number]) demonstrating an affect that was flat and anxious. Ciro demonstrating mood that is depressed and anxious. Ciro had an appearance that was clean. Ciro denies suicidal ideation. Ciro denies suicide plan. Ciro denies homicidal ideation. Ciro denies hallucinations.    Ciro's  height is 5' 6" (1.676 m) and weight is 65.8 kg (145 lb). His temperature is 97.7 °F (36.5 °C). His blood pressure is 116/77 and his pulse is 95. His respiration is 18 and oxygen saturation is 97%.     Ciro's last BM was noted on: 2024      Intervention:    Encourage Ciro to perform self-hygiene, grooming, and changing of clothing. Monitor Ciro's behavior and program compliance. Monitor Ciro for suicidal ideation, homicidal ideation, sleep disturbance, and hallucinations. Encourage Ciro to eat all portions of meals and assess for meal preferences. Monitor Ciro for intake and output to ensure hydration. Notify the Physician/Physician Assistant/Advance Practice Registered Nurse (MD/PA/APRN) for any medication refusal and any change in patient condition.      Response:    Ciro verbalizes understand of unit process and procedures. Ciro reported medications are effective in decreasing anxiety and depression.      Plan:     Continue to monitor per MD/PA/APRN orders; maintain patient safety.   "

## 2024-02-23 NOTE — PROGRESS NOTES
"2/23/2024  Ciro King   2005   [id number]        Psychiatry Progress Note     Chief Complaint: "I feel like my brain is hot"    SUBJECTIVE:   Ciro King is a 18 y.o. male placed under a PEC at [facility] after presenting with hallucinations and suicidal thoughts.     Staff reports that patient has been expressing anxiety about being alone at discharge. I do believe a lot of his issue is anxiety. He does not appear to be responding to any internal stimuli. He answers appropriately. Is not easily distracted. Today patient states that he feels OK. Continues to report that he is not feeling well but agin I believe this is anxiety and fear of being alone. Affect remains anxious. Poor sleep continues but patient states that the Trazodone has helped. Will give a prescription for this on dc. Will continue current POC and discharge home today.       UDS: (-)  Blood alcohol: <10    Current Medications:   Scheduled Meds:    ARIPiprazole  15 mg Oral Daily    EScitalopram oxalate  20 mg Oral Daily    hydrOXYzine HCL  50 mg Oral TID      PRN Meds: acetaminophen, aluminum-magnesium hydroxide-simethicone, haloperidoL **AND** diphenhydrAMINE **AND** LORazepam **AND** haloperidol lactate **AND** diphenhydrAMINE **AND** lorazepam, hydrOXYzine HCL, traZODone   Psychotherapeutics (From admission, onward)      Start     Stop Route Frequency Ordered    02/22/24 0900  EScitalopram oxalate tablet 20 mg         -- Oral Daily 02/21/24 0940    02/22/24 0900  ARIPiprazole tablet 15 mg         -- Oral Daily 02/21/24 0940    02/21/24 0940  traZODone tablet 150 mg         -- Oral Nightly PRN 02/21/24 0940    02/17/24 0221  haloperidoL tablet 10 mg  (Med - Acute  Behavioral Management)        See Hyperspace for full Linked Orders Report.    -- Oral Every 4 hours PRN 02/17/24 0221 02/17/24 0221  LORazepam tablet 2 mg  (Med - Acute  Behavioral Management)        See Hyperspace for full Linked Orders Report.    -- " "Oral Every 4 hours PRN 02/17/24 0221    02/17/24 0221  haloperidol lactate injection 10 mg  (Med - Acute  Behavioral Management)        See Hyperspace for full Linked Orders Report.    -- IM Every 4 hours PRN 02/17/24 0221    02/17/24 0221  LORazepam injection 2 mg  (Med - Acute  Behavioral Management)        See Hyperspace for full Linked Orders Report.    -- IM Every 4 hours PRN 02/17/24 0221            Allergies:   Review of patient's allergies indicates:  No Known Allergies     OBJECTIVE:   Vitals   Vitals:    02/23/24 0701   BP: 123/84   Pulse: (!) 132   Resp: 18   Temp: 98.1 °F (36.7 °C)        Labs/Imaging/Studies:   No results found for this or any previous visit (from the past 36 hour(s)).         Medical Review Of Systems:  Constitutional: negative  Respiratory: negative  Cardiovascular: negative  Gastrointestinal: negative  Genitourinary:negative  Musculoskeletal:negative  Neurological: negative       Psychiatric Mental Status Exam:  General Appearance: appears stated age, well-developed, well-nourished  Arousal: alert  Behavior: cooperative  Movements and Motor Activity: no abnormal involuntary movements noted  Orientation: oriented to person, place, time, and situation  Speech: normal rate, normal rhythm, normal volume, normal tone  Mood: "I feel bad"  Affect: constricted, anxious  Thought Process: grossly linear  Associations: intact  Thought Content and Perceptions: denies hallucinations. (+) somatic delusions, denies suicidal ideation, no homicidal ideation  Recent and Remote Memory: recent memory intact, remote memory intact; per interview/observation with patient  Attention and Concentration: intact, attentive to conversation; per interview/observation with patient  Fund of Knowledge: intact, aware of current events, vocabulary appropriate; based on history, vocabulary, fund of knowledge, syntax, grammar, and content  Insight: questionable; based on understanding of severity of illness and " HPI  Judgment: questionable; based on patient's behavior and HPI    ASSESSMENT/PLAN:   Problems Addressed/Diagnoses:  Unspecified Psychotic Disorder (F29)  Unspecified Mood Disorder (F39)  Anxiety (F41.9)  Insomnia (F51.01)    No past medical history on file.     Plan:  Psychosis, acute  -Abilify 15 mg daily    Mood disorder, acute  -Lexapro 20 mg daily    Anxiety, acute  -Hydroxyzine 50mg TID    Insomnia, acute  -trazodone 150 mg PRN      Expected Disposition Plan: Home        ABUNDIO Ramírez-BC

## 2024-02-23 NOTE — NURSING
Pt reports that he is feeling very bad and he is anxious about possibly going home tomorrow. Pt reports that he does not feel safe going home alone. Pt denies any suicidal ideations at this time. Encouraged patient to discuss his concerns with the doctor tomorrow.

## 2024-02-23 NOTE — NURSING
Patient comes to the nurses station. Reports that he is so anxious that he can't sleep. Reports racing thoughts, concerned about what will happen if he goes home. Fearful of being alone. Trazodone 150 mg and Hydroxyzine 50 mg po given.

## 2024-02-23 NOTE — DISCHARGE INSTRUCTIONS
Discharge instructions reviewed and explained to pt. Pt verbalized understanding. Discharge instructions given to pt. Pt adequate for discharge.

## 2024-02-23 NOTE — PLAN OF CARE
Problem: Adult Inpatient Plan of Care  Goal: Plan of Care Review  Outcome: Met  Goal: Patient-Specific Goal (Individualized)  Outcome: Met  Goal: Absence of Hospital-Acquired Illness or Injury  Outcome: Met  Goal: Optimal Comfort and Wellbeing  Outcome: Met  Goal: Readiness for Transition of Care  Outcome: Met     Problem: Behavior Regulation Impairment (Psychotic Signs/Symptoms)  Goal: Improved Behavioral Control (Psychotic Signs/Symptoms)  Outcome: Met     Problem: Cognitive Impairment (Psychotic Signs/Symptoms)  Goal: Optimal Cognitive Function (Psychotic Signs/Symptoms)  Outcome: Met     Problem: Decreased Participation and Engagement (Psychotic Signs/Symptoms)  Goal: Increased Participation and Engagement (Psychotic Signs/Symptoms)  Outcome: Met     Problem: Mood Impairment (Psychotic Signs/Symptoms)  Goal: Improved Mood Symptoms (Psychotic Signs/Symptoms)  Outcome: Met     Problem: Psychomotor Impairment (Psychotic Signs/Symptoms)  Goal: Improved Psychomotor Symptoms (Psychotic Signs/Symptoms)  Outcome: Met     Problem: Sensory Perception Impairment (Psychotic Signs/Symptoms)  Goal: Decreased Sensory Symptoms (Psychotic Signs/Symptoms)  Outcome: Met     Problem: Sleep Disturbance (Psychotic Signs/Symptoms)  Goal: Improved Sleep (Psychotic Signs/Symptoms)  Outcome: Met     Problem: Social, Occupational or Functional Impairment (Psychotic Signs/Symptoms)  Goal: Enhanced Social, Occupational or Functional Skills (Psychotic Signs/Symptoms)  Outcome: Met     Problem: Activity and Energy Impairment (Depressive Signs/Symptoms)  Goal: Optimized Energy Level (Depressive Signs/Symptoms)  Outcome: Met     Problem: Cognitive Impairment (Depressive Signs/Symptoms)  Goal: Optimized Cognitive Function  Outcome: Met     Problem: Decreased Participation/Engagement (Depressive Signs/Symptoms)  Goal: Increased Participation and Engagement (Depressive Signs/Symptoms)  Outcome: Met     Problem: Feelings of Worthlessness,  Hopelessness or Excessive Guilt (Depressive Signs/Symptoms)  Goal: Enhanced Self-Esteem and Confidence (Depressive Signs/Symptoms)  Outcome: Met     Problem: Mood Impairment (Depressive Signs/Symptoms)  Goal: Improved Mood Symptoms (Depressive Signs/Symptoms)  Outcome: Met     Problem: Nutrition Imbalance (Depressive Signs/Symptoms)  Goal: Optimized Nutrition Intake  Outcome: Met     Problem: Psychomotor Impairment (Depressive Signs/Symptoms)  Goal: Improved Psychomotor Symptoms (Depressive Signs/Symptoms)  Outcome: Met     Problem: Sleep Disturbance (Depressive Signs/Symptoms)  Goal: Improved Sleep (Depressive Signs/Symptoms)  Outcome: Met     Problem: Social, Occupational or Functional Impairment (Depressive Signs/Symptoms)  Goal: Enhanced Social, Occupational or Functional Skills (Depressive Signs/Symptoms)  Outcome: Met     Problem: Violence Risk or Actual  Goal: Anger and Impulse Control  Outcome: Met

## 2024-02-26 PROBLEM — F32.A DEPRESSION: Status: RESOLVED | Noted: 2024-02-23 | Resolved: 2024-02-26

## 2024-02-26 PROBLEM — F41.9 ANXIETY DISORDER, UNSPECIFIED: Status: RESOLVED | Noted: 2024-02-23 | Resolved: 2024-02-26

## 2024-02-26 PROBLEM — F29 PSYCHOTIC DISORDER: Status: ACTIVE | Noted: 2024-02-26

## 2024-02-26 PROBLEM — F39 MODERATE MOOD DISORDER: Status: ACTIVE | Noted: 2024-02-26

## 2024-02-26 PROBLEM — F51.01 PRIMARY INSOMNIA: Status: ACTIVE | Noted: 2024-02-26
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
